# Patient Record
Sex: MALE | Race: WHITE | NOT HISPANIC OR LATINO | Employment: OTHER | URBAN - METROPOLITAN AREA
[De-identification: names, ages, dates, MRNs, and addresses within clinical notes are randomized per-mention and may not be internally consistent; named-entity substitution may affect disease eponyms.]

---

## 2017-06-28 ENCOUNTER — OFFICE VISIT (OUTPATIENT)
Dept: NEUROLOGY | Facility: CLINIC | Age: 67
End: 2017-06-28

## 2017-06-28 ENCOUNTER — LAB (OUTPATIENT)
Dept: LAB | Facility: HOSPITAL | Age: 67
End: 2017-06-28

## 2017-06-28 VITALS
WEIGHT: 203 LBS | HEIGHT: 71 IN | BODY MASS INDEX: 28.42 KG/M2 | DIASTOLIC BLOOD PRESSURE: 79 MMHG | SYSTOLIC BLOOD PRESSURE: 122 MMHG

## 2017-06-28 DIAGNOSIS — G31.84 MILD COGNITIVE IMPAIRMENT: ICD-10-CM

## 2017-06-28 DIAGNOSIS — G31.84 MILD COGNITIVE IMPAIRMENT: Primary | ICD-10-CM

## 2017-06-28 LAB
ALBUMIN SERPL-MCNC: 4.3 G/DL (ref 3.2–4.8)
ALBUMIN/GLOB SERPL: 1.6 G/DL (ref 1.5–2.5)
ALP SERPL-CCNC: 99 U/L (ref 25–100)
ALT SERPL W P-5'-P-CCNC: 46 U/L (ref 7–40)
ANION GAP SERPL CALCULATED.3IONS-SCNC: 5 MMOL/L (ref 3–11)
AST SERPL-CCNC: 46 U/L (ref 0–33)
BASOPHILS # BLD AUTO: 0.02 10*3/MM3 (ref 0–0.2)
BASOPHILS NFR BLD AUTO: 0.3 % (ref 0–1)
BILIRUB SERPL-MCNC: 0.7 MG/DL (ref 0.3–1.2)
BUN BLD-MCNC: 17 MG/DL (ref 9–23)
BUN/CREAT SERPL: 18.9 (ref 7–25)
CALCIUM SPEC-SCNC: 9.4 MG/DL (ref 8.7–10.4)
CHLORIDE SERPL-SCNC: 106 MMOL/L (ref 99–109)
CO2 SERPL-SCNC: 30 MMOL/L (ref 20–31)
CREAT BLD-MCNC: 0.9 MG/DL (ref 0.6–1.3)
DEPRECATED RDW RBC AUTO: 45.9 FL (ref 37–54)
EOSINOPHIL # BLD AUTO: 0.24 10*3/MM3 (ref 0–0.3)
EOSINOPHIL NFR BLD AUTO: 3.4 % (ref 0–3)
ERYTHROCYTE [DISTWIDTH] IN BLOOD BY AUTOMATED COUNT: 14.2 % (ref 11.3–14.5)
FOLATE SERPL-MCNC: 23.97 NG/ML (ref 3.2–20)
GFR SERPL CREATININE-BSD FRML MDRD: 84 ML/MIN/1.73
GLOBULIN UR ELPH-MCNC: 2.7 GM/DL
GLUCOSE BLD-MCNC: 101 MG/DL (ref 70–100)
HCT VFR BLD AUTO: 45.5 % (ref 38.9–50.9)
HGB BLD-MCNC: 15.3 G/DL (ref 13.1–17.5)
IMM GRANULOCYTES # BLD: 0.01 10*3/MM3 (ref 0–0.03)
IMM GRANULOCYTES NFR BLD: 0.1 % (ref 0–0.6)
LYMPHOCYTES # BLD AUTO: 1.89 10*3/MM3 (ref 0.6–4.8)
LYMPHOCYTES NFR BLD AUTO: 26.9 % (ref 24–44)
MCH RBC QN AUTO: 29.5 PG (ref 27–31)
MCHC RBC AUTO-ENTMCNC: 33.6 G/DL (ref 32–36)
MCV RBC AUTO: 87.8 FL (ref 80–99)
MONOCYTES # BLD AUTO: 0.54 10*3/MM3 (ref 0–1)
MONOCYTES NFR BLD AUTO: 7.7 % (ref 0–12)
NEUTROPHILS # BLD AUTO: 4.33 10*3/MM3 (ref 1.5–8.3)
NEUTROPHILS NFR BLD AUTO: 61.6 % (ref 41–71)
PLATELET # BLD AUTO: 181 10*3/MM3 (ref 150–450)
PMV BLD AUTO: 9.8 FL (ref 6–12)
POTASSIUM BLD-SCNC: 4.8 MMOL/L (ref 3.5–5.5)
PROT SERPL-MCNC: 7 G/DL (ref 5.7–8.2)
RBC # BLD AUTO: 5.18 10*6/MM3 (ref 4.2–5.76)
RPR SER QL: NORMAL
SODIUM BLD-SCNC: 141 MMOL/L (ref 132–146)
TSH SERPL DL<=0.05 MIU/L-ACNC: 4.65 MIU/ML (ref 0.35–5.35)
VIT B12 BLD-MCNC: 492 PG/ML (ref 211–911)
WBC NRBC COR # BLD: 7.03 10*3/MM3 (ref 3.5–10.8)

## 2017-06-28 PROCEDURE — 99205 OFFICE O/P NEW HI 60 MIN: CPT | Performed by: PSYCHIATRY & NEUROLOGY

## 2017-06-28 PROCEDURE — 84443 ASSAY THYROID STIM HORMONE: CPT | Performed by: PSYCHIATRY & NEUROLOGY

## 2017-06-28 PROCEDURE — 36415 COLL VENOUS BLD VENIPUNCTURE: CPT | Performed by: PSYCHIATRY & NEUROLOGY

## 2017-06-28 PROCEDURE — 80053 COMPREHEN METABOLIC PANEL: CPT | Performed by: PSYCHIATRY & NEUROLOGY

## 2017-06-28 PROCEDURE — 82607 VITAMIN B-12: CPT | Performed by: PSYCHIATRY & NEUROLOGY

## 2017-06-28 PROCEDURE — 82746 ASSAY OF FOLIC ACID SERUM: CPT | Performed by: PSYCHIATRY & NEUROLOGY

## 2017-06-28 PROCEDURE — 86592 SYPHILIS TEST NON-TREP QUAL: CPT | Performed by: PSYCHIATRY & NEUROLOGY

## 2017-06-28 PROCEDURE — 85025 COMPLETE CBC W/AUTO DIFF WBC: CPT | Performed by: PSYCHIATRY & NEUROLOGY

## 2017-06-28 RX ORDER — DONEPEZIL HYDROCHLORIDE 5 MG/1
5 TABLET, FILM COATED ORAL DAILY
Qty: 30 TABLET | Refills: 11 | Status: SHIPPED | OUTPATIENT
Start: 2017-06-28 | End: 2017-07-31

## 2017-06-28 RX ORDER — CHLORAL HYDRATE 500 MG
CAPSULE ORAL
COMMUNITY
End: 2020-10-14

## 2017-06-28 NOTE — PROGRESS NOTES
"Subjective     CC: cognitive impairment    History of Present Illness   Christo Prince is a 67 y.o. male who comes to clinic today for evaluation of cognitive impairment. He has noted symptoms since July, 2016 after CABG at [facility] marked primarily by word-finding difficulties . His comprehension seems intact, and he is not making paraphasic errors. This has possible worsening over time. Additional associated symptoms have included impairments in executive function. His symptoms are worsened when under stress. He is currently residing at home with his wife in [place].     I reviewed an MRI of the brain from 8/2/17 which shows multiple punctate bilateral infarcts including one left frontally.    The following portions of the patient's history were reviewed and updated as appropriate: allergies, current medications, past family history, past medical history, past social history, past surgical history and problem list.    Review of Systems   Constitutional: Negative.    Respiratory: Negative.    Cardiovascular: Negative.    Gastrointestinal: Negative.    Genitourinary: Negative.    All other systems reviewed and are negative.      Objective   General appearance today is normal.   Peripheral pulses were present and symmetric.   The ophthalmoscopic exam today is unremarkable. The discs and posterior elements are unremarkable.    /79  Ht 71\" (180.3 cm)  Wt 203 lb (92.1 kg)  BMI 28.31 kg/m2    Physical Exam   Constitutional: He is oriented to person, place, and time.   Neurological: He is oriented to person, place, and time. He has normal strength. He has a normal Finger-Nose-Finger Test. Gait normal.   Reflex Scores:       Tricep reflexes are 2+ on the right side and 2+ on the left side.       Bicep reflexes are 2+ on the right side and 2+ on the left side.       Brachioradialis reflexes are 2+ on the right side and 2+ on the left side.       Patellar reflexes are 2+ on the right side and 2+ on the left " side.       Achilles reflexes are 2+ on the right side and 2+ on the left side.  Psychiatric: His speech is normal.        Neurologic Exam     Mental Status   Oriented to person, place, and time.   Registration: recalls 3 of 3 objects. Recall at 5 minutes: recalls 1 of 3 objects. Follows 3 step commands.   Attention: normal. Concentration: normal.   Speech: speech is normal   Level of consciousness: alert  Knowledge: good.   Able to name object. Able to read. Able to repeat. Able to write. Normal comprehension.     Cranial Nerves   Cranial nerves II through XII intact.     Motor Exam   Muscle bulk: normal  Overall muscle tone: normal    Strength   Strength 5/5 throughout.     Sensory Exam   Light touch normal.     Gait, Coordination, and Reflexes     Gait  Gait: normal    Coordination   Finger to nose coordination: normal    Reflexes   Right brachioradialis: 2+  Left brachioradialis: 2+  Right biceps: 2+  Left biceps: 2+  Right triceps: 2+  Left triceps: 2+  Right patellar: 2+  Left patellar: 2+  Right achilles: 2+  Left achilles: 2+      MMSE=28      Assessment/Plan   Christo was seen today for memory loss.    Diagnoses and all orders for this visit:    Mild cognitive impairment  -     CBC & Differential; Future  -     Comprehensive Metabolic Panel  -     Folate  -     Vitamin B12  -     TSH  -     RPR  -     Ambulatory Referral to Speech Therapy    Other orders  -     donepezil (ARICEPT) 5 MG tablet; Take 1 tablet by mouth Daily.          DISCUSSION/SUMMARY    Christo Prince comes to clinic today for evaluation of cognitive impairment. His history and examination, including bedside cognitive testing are most consistent with Mild Cognitive Impairment (MCI) , which was discussed. This may be due to prior stroke, and VCI, though we will need to follow this to make sure this does not progress into another disorder. For now it was elected to obtain screening blood work . After discussing potential treatment options, it was  elected to add  donepezil. He will then follow up in 1 month , or sooner if needed. At that time, we will consider increasing his donepezil and/or the addition of Namenda. I also referred him for SLP.    As part of this visit I reviewed radiology results, reviewed radiology images, obtained additional history from the family which is incorporated in the HPI and reviewed records from prior hospitalizations which is incorporated in the HPI. Please see above for additional details.

## 2017-07-07 ENCOUNTER — HOSPITAL ENCOUNTER (OUTPATIENT)
Dept: SPEECH THERAPY | Facility: HOSPITAL | Age: 67
Setting detail: THERAPIES SERIES
Discharge: HOME | End: 2017-07-07

## 2017-07-07 DIAGNOSIS — G31.84 MILD COGNITIVE IMPAIRMENT: Primary | ICD-10-CM

## 2017-07-07 PROCEDURE — 92523 SPEECH SOUND LANG COMPREHEN: CPT

## 2017-07-07 PROCEDURE — G9168 MEMORY CURRENT STATUS: HCPCS

## 2017-07-07 PROCEDURE — G9169 MEMORY GOAL STATUS: HCPCS

## 2017-07-07 NOTE — THERAPY EVALUATION
"Outpatient Speech Language Pathology   Adult Speech Language Cognitive Initial Evaluation   [place]     Patient Name: Christo Prince  : 1950  MRN: [id number]  Today's Date: 2017        Visit Date: 2017   Patient Active Problem List   Diagnosis   • Benign essential hypertension   • Gastroesophageal reflux disease   • CAD in native artery   • Fahr's disease   • S/P CABG x 5   • Mild cognitive impairment        Past Medical History:   Diagnosis Date   • Coronary artery disease    • Migraines         Past Surgical History:   Procedure Laterality Date   • CORONARY ARTERY BYPASS GRAFT N/A 2016    Procedure: INTRAOPERATIVE FRANCES, MIDLINE STERNOTOMY WITH CORONARY ARTERY BYPASS GRAPHS X 5 UTILIZING BILTERAL ANGELA'S AND LEFT ENDOSCOPIC HARVESTED SAPHENOUS VEIN, AND RIGHT OPEN HARVESTED SAPHENOUS VEIN;  Surgeon: De Montgomery MD;  Location: [facility];  Service:    • KNEE SURGERY Right          Visit Dx:    ICD-10-CM ICD-9-CM   1. Mild cognitive impairment G31.84 331.83                 Adult Speech Language - 17 0800     Background and History    Reason for Referral Pt is a 67 year old male with a hx of CVA and in the past year has noticed word finding difficulties. Pt's wife states that he has Alzheimer's but pt does not think he does.   -HG    Stated Goals \"To join the conversation again.\"  -    Description of Complaint Pt states that he is slow to respond and there are  certain things he can't recall but during assessment, he couldn't find the words to explain them.  -HG    Previous Functional Status Functional in all spheres  -HG    Current Baseline Abilities Pt independent with all tasks including bathing, dressing, med management, finances, etc.  -HG    Primary Language in the Home English  -HG    Primary Caregiver Spouse  -HG    Informant for the Evaluation Self  -HG    Comprehension    Recognition WFL: Within Functional Limits  -HG    Answer Questions WFL: Within Functional Limits  " -HG    Commands WFL: Within Functional Limits  -HG    Conversation WFL: Within Functional Limits  -HG    Reading Status WFL: Within Functional Limits  -HG    Expression    Primary Mode of Expression verbal  -HG    Dominant Hand Right  -HG    Expressive Language WFL  -HG    Receptive Language WFL  -HG    RBANS- Repeatable Battery for the Assessment of Neuropsychological Status    Immediate Memory Index Score 73  -HG    Immediate Memory Percentile 4 %  -HG    Immediate Memory Qualitative Description borderline  -HG    Visuospatial Index Score 72  -HG    Visuospatial Percentile 3 %  -HG    Visuospatial Qualitative Description borderline  -HG    Language Index Score 74  -HG    Language Percentile 4 %  -HG    Language Qualitative Description borderline  -HG    Attention Index Score 68  -HG    Attention Percentile 2 %  -HG    Attention Qualitative Description extremely low  -HG    Delayed Memory Index Score 60  -HG    Delayed Memory Percentile 0 %  -HG    Delayed Memory Qualitative Description extremely low  -HG    Total Index Score 347  -HG    Total Percentile 1 %  -HG    Total Qualitative Description extremely low  -HG      User Key  (r) = Recorded By, (t) = Taken By, (c) = Cosigned By    Initials Name Provider Type     Jennifer Arreola MS [facility]-SLP Speech and Language Pathologist                               OP SLP Education       07/07/17 0800    Education    Barriers to Learning No barriers identified  -    Education Provided Described results of evaluation;Patient expressed understanding of evaluation;Family/caregivers expressed understanding of evaluation;Patient requires further education on strategies, risks;Family/caregivers require further education on strategies, risks  -    Assessed Learning needs;Learning motivation;Learning preferences;Learning readiness  -    Learning Motivation Strong  -    Learning Method Explanation;Demonstration;Teach back;Written materials  -    Teaching Response  "Verbalized understanding;Demonstrated understanding;Reinforcement needed  -HG    Education Comments Pt provided with copy of internal and external memory strategies. Pt also given homework for word finding.   -HG      User Key  (r) = Recorded By, (t) = Taken By, (c) = Cosigned By    Initials Name Effective Dates    HG Jennifer Arreola, MS [facility]-SLP 06/22/15 -                 SLP OP Goals       07/07/17 1000       Goal Type Needed    Goal Type Needed Memory;Verbal Expression;Other Adult Goals  -HG     Subjective Comments    Subjective Comments Pt alert, cooperative, quiet in nature and stated, \"my wife doesn't let me out of her site\" Wife joined us at the end of the session for education regarding compensatory memory strategies.   -HG     Verbal Expression Goals    Verbal Expression LTG's Patient will be able to use verbal expressive language skills to communicate effectively in all situations with unfamiliar listener  -HG     Patient will be able to use verbal expressive language skills to communicate effectively in all situations with unfamiliar listener 90%:;with intermittent cues  -HG     Status: Patient will be able to use verbal expressive language skills to communicate effectively in all situations with unfamiliar listener New  -HG     Patient will improve verbal expressive language skills by describing single/multiple similarities and differences between two target items 80%:;with cues  -HG     Status: Patient will improve verbal expressive language skills by describing single/multiple similarities and differences between two target items New  -HG     Patient will improve verbal expressive language skills by completing divergent naming tasks 90%:;with cues  -HG     Status: Patient will improve verbal expressive language skills by completing divergent naming tasks New  -HG     Patient will improve verbal expressive language skills by listing words associated to target word provided 80%  -HG     Status: Patient " will improve verbal expressive language skills by listing words associated to target word provided New  -HG     Patient will improve verbal expressive language skills by describing attributes, function, action and/or uses of an object/item 80%:;with cues  -HG     Status: Patient will improve verbal expressive language skills by describing attributes, function, action and/or uses of an object/item New  -HG     Patient will improve verbal expressive language skills by completing convergent naming tasks 80%:;with cues  -HG     Status: Patient will improve verbal expressive language skills by completing convergent naming tasks New  -HG     Memory Goals    Patient and family will implement compensatory strategies to maximize patient’s Memory function so patient can continue to participate in daily activities 90%:;with intermittent cues  -HG     Status: Patient and family will implement compensatory strategies to maximize patient’s Memory function so patient can continue to participate in daily activities New  -HG     Patient will demonstrate improved ability to recall information by immediately recalling a series of words 80%:;related;with intermittent cues  -HG     Status: Patient will demonstrate improved ability to recall information by immediately recalling a series of words New  -HG     Patient’s memory skills will be enhanced as reported by patient by utilizing internal memory strategies to recall up to 3 pieces of information after a 5- minute delay 80%:;with intermittent cues  -HG     Status: Patient’s memory skills will be enhanced as reported by patient by utilizing internal memory strategies to recall up to 3 pieces of information after a 5- minute delay New  -HG     Status: Patient’s memory skills will be enhanced as reported by patient by using external memory aides New  -HG     Other Goals    Other Adult Goal- 1 Pt will participate in one social activity a week in order to improve his functional language at  conversation level as reported by the wife.  -HG     Status: Other Adult Goal- 1 New  -HG     Other Adult Goal- 2 Pt will improve RBANS score to at least the low average range across all assessments.   -HG     Status: Other Adult Goal- 2 New  -HG     SLP Time Calculation    SLP Goal Re-Cert Due Date 08/06/17  -HG       User Key  (r) = Recorded By, (t) = Taken By, (c) = Cosigned By    Initials Name Provider Type     Jennifer Arreola MS [facility]-SLP Speech and Language Pathologist                OP SLP Assessment/Plan - 07/07/17 0800     SLP Assessment    Functional Problems Speech Language- Adult/Cognition  -HG    Impact on Function: Adult Speech Language/Cognition Difficulty communicating wants, needs, and ideas;Restrictions in personal and social life;Difficulty sequencing thoughts to express complex messages;Difficulty participating in avocational activities;Trouble learning or remembering new information;Poor attention to task  -HG    Clinical Impression: Speech Language-Adult/Congnition Moderate-Severe:;Cognitive Communication Impairment  -HG    Functional Problems Comment Pt is not socializing and has restricted his socialization due to his word finding difficutlies.  -HG    Clinical Impression Comments RBANS given and Total score fell into the Extremely Low Range.  -HG    Please refer to paper survey for additional self-reported information Yes  -HG    Please refer to items scanned into chart for additional diagnostic informaiton and handouts as provided by clinician Yes  -HG    SLP Diagnosis Moderate to Severe Cognitive Linguistic deficits  -HG    Prognosis Good (comment)  -HG    Patient/caregiver participated in establishment of treatment plan and goals Yes  -HG    Patient would benefit from skilled therapy intervention Yes  -HG    SLP Plan    Frequency 2x/week  -HG    Duration 8 weeks  -HG    Planned CPT's? SLP SPEECH & LANGUAGE EVAL: 43445;SLP INDIVIDUAL SPEECH THERAPY: 10666  -HG    Expected Duration  Therapy Session (min) 45-60 minutes  -HG    Plan Comments Initiate Cognitve tx.   -HG      User Key  (r) = Recorded By, (t) = Taken By, (c) = Cosigned By    Initials Name Provider Type    [place]ON Arreola MS CCC-SLP Speech and Language Pathologist                 Time Calculation:   SLP Start Time: 0800    Therapy Charges for Today     Code Description Service Date Service Provider Modifiers Qty    69048464372 HC ST MEMORY CURRENT 7/7/2017 Jennifer Arreola MS CCC-SLP GN, CL 1    97856667473 HC ST MEMORY PROJECTED 7/7/2017 Jennifer Arreola MS CCC-SLP GN, CJ 1    [id number] HC ST EVAL SPEECH AND PROD W LANG  5 7/7/2017 Jennifer Arreola MS CCC-SLP GN 1          SLP G-Codes  Functional Limitations: Memory  Memory Current Status (): At least 60 percent but less than 80 percent impaired, limited or restricted  Memory Goal Status (): At least 20 percent but less than 40 percent impaired, limited or restricted        Jennifer Arreola MS CCC-SLP  7/7/2017

## 2017-07-10 ENCOUNTER — HOSPITAL ENCOUNTER (OUTPATIENT)
Dept: SPEECH THERAPY | Facility: HOSPITAL | Age: 67
Setting detail: THERAPIES SERIES
Discharge: HOME | End: 2017-07-10

## 2017-07-10 DIAGNOSIS — G31.84 MILD COGNITIVE IMPAIRMENT: Primary | ICD-10-CM

## 2017-07-10 PROCEDURE — 92507 TX SP LANG VOICE COMM INDIV: CPT

## 2017-07-10 NOTE — THERAPY TREATMENT NOTE
"Outpatient Speech Language Pathology   Adult Speech Language Cognitive Treatment Note   [place]     Patient Name: Christo Prince  : 1950  MRN: [id number]  Today's Date: 7/10/2017         Visit Date: 07/10/2017   Patient Active Problem List   Diagnosis   • Benign essential hypertension   • Gastroesophageal reflux disease   • CAD in native artery   • Fahr's disease   • S/P CABG x 5   • Mild cognitive impairment          Visit Dx:    ICD-10-CM ICD-9-CM   1. Mild cognitive impairment G31.84 331.83             Adult Speech Language - 17 0800     Background and History    Reason for Referral Pt is a 67 year old male with a hx of CVA and in the past year has noticed word finding difficulties. Pt's wife states that he has Alzheimer's but pt does not think he does.   -HG    Stated Goals \"To join the conversation again.\"  -HG    Description of Complaint Pt states that he is slow to respond and there are  certain things he can't recall but during assessment, he couldn't find the words to explain them.  -HG    Previous Functional Status Functional in all spheres  -HG    Current Baseline Abilities Pt independent with all tasks including bathing, dressing, med management, finances, etc.  -HG    Pertinent Medications donepezil  -HG    Primary Language in the Home English  -HG    Primary Caregiver Spouse  -HG    Informant for the Evaluation Self  -HG    Comprehension    Recognition WFL: Within Functional Limits  -HG    Answer Questions WFL: Within Functional Limits  -HG    Commands WFL: Within Functional Limits  -HG    Conversation WFL: Within Functional Limits  -HG    Reading Status WFL: Within Functional Limits  -HG    Expression    Primary Mode of Expression verbal  -HG    Dominant Hand Right  -HG    Expressive Language WFL  -HG    Receptive Language WFL  -HG    Cognitive Communication and Memory    Attention WFL except;sustained attention;selective attention;alternating attention;divided attention  -HG    " Sustained Attention Moderate severe  -HG    Selective Attention Moderate severe  -HG    Alternating Attention To be assessed in therapy  -HG    Divided Attention To be assessed in therapy  -HG    Orientation WFL: Within Functional Limits  -HG    Memory WFL except;working memory;procedural  -HG    Working Memory Moderate severe  -HG    Procedural Memory Moderate severe  -HG    Executive Function to be assessed in therapy  -HG    Calculations to be assessed in therapy  -HG    RBANS- Repeatable Battery for the Assessment of Neuropsychological Status    Immediate Memory Index Score 73  -HG    Immediate Memory Percentile 4 %  -HG    Immediate Memory Qualitative Description borderline  -HG    Visuospatial Index Score 72  -HG    Visuospatial Percentile 3 %  -HG    Visuospatial Qualitative Description borderline  -HG    Language Index Score 74  -HG    Language Percentile 4 %  -HG    Language Qualitative Description borderline  -HG    Attention Index Score 68  -HG    Attention Percentile 2 %  -HG    Attention Qualitative Description extremely low  -HG    Delayed Memory Index Score 60  -HG    Delayed Memory Percentile 0 %  -HG    Delayed Memory Qualitative Description extremely low  -HG    Total Index Score 347  -HG    Total Percentile 1 %  -HG    Total Qualitative Description extremely low  -HG      User Key  (r) = Recorded By, (t) = Taken By, (c) = Cosigned By    Initials Name Provider Type     Jennifer Arreola MS CCC-SLP Speech and Language Pathologist                              SLP OP Goals       07/10/17 0800       Goal Type Needed    Goal Type Needed Verbal Expression;Memory;Other Adult Goals  -HG     Subjective Comments    Subjective Comments Pt alert, cooperative and stated that he felt about 'half'  -HG     Verbal Expression Goals    Verbal Expression LTG's Patient will be able to use verbal expressive language skills to communicate effectively in all situations with unfamiliar listener  -HG     Patient will be  able to use verbal expressive language skills to communicate effectively in all situations with unfamiliar listener 90%:;with intermittent cues  -HG     Status: Patient will be able to use verbal expressive language skills to communicate effectively in all situations with unfamiliar listener Progressing as expected  -HG     Comments: Patient will be able to use verbal expressive language skills to communicate effectively in all situations with unfamiliar listener 7/10/17: Pt completed a divergent naming task for homework with 80% accuracy.   -HG     Patient will improve verbal expressive language skills by describing single/multiple similarities and differences between two target items 80%:;with cues  -HG     Status: Patient will improve verbal expressive language skills by describing single/multiple similarities and differences between two target items Progressing as expected  -HG     Comments: Patient will improve verbal expressive language skills by describing single/multiple similarities and differences between two target items 7/10/17: Similarities and Differences:   -HG     Patient will improve verbal expressive language skills by completing divergent naming tasks 90%:;with cues  -HG     Status: Patient will improve verbal expressive language skills by completing divergent naming tasks Progressing as expected  -HG     Comments: Patient will improve verbal expressive language skills by completing divergent naming tasks 7/10/17: Homework for divergent concrete category naming and pt was 80% accurate.   -HG     Patient will improve verbal expressive language skills by listing words associated to target word provided 80%  -HG     Status: Patient will improve verbal expressive language skills by listing words associated to target word provided New  -HG     Patient will improve verbal expressive language skills by describing attributes, function, action and/or uses of an object/item 80%:;with cues  -HG     Status:  Patient will improve verbal expressive language skills by describing attributes, function, action and/or uses of an object/item New  -HG     Patient will improve verbal expressive language skills by completing convergent naming tasks 80%:;with cues  -HG     Status: Patient will improve verbal expressive language skills by completing convergent naming tasks Progressing as expected  -HG     Comments: Patient will improve verbal expressive language skills by completing convergent naming tasks 7/10/17: Convergent naming: pt was 50% accurate.   -HG     Memory Goals    Patient and family will implement compensatory strategies to maximize patient’s Memory function so patient can continue to participate in daily activities 90%:;with intermittent cues  -HG     Status: Patient and family will implement compensatory strategies to maximize patient’s Memory function so patient can continue to participate in daily activities Progressing as expected  -HG     Comments: Patient and family will implement compensatory strategies to maximize patient’s Memory function so patient can continue to participate in daily activities 7/10/17: Pt stated he used visualization at home when working on divergent naming task.   -HG     Patient will demonstrate improved ability to recall information by immediately recalling a series of words 80%:;related;with intermittent cues  -HG     Status: Patient will demonstrate improved ability to recall information by immediately recalling a series of words Progressing as expected  -HG     Comments: Patient will demonstrate improved ability to recall information by immediately recalling a series of words 7/10/17: 3 word: 3/3x 6, 4 word: 3/3 x 6. 3 word reverse order: pt was 90% accurate, 4 word reversal: 70% accuracy.   -HG     Patient’s memory skills will be enhanced as reported by patient by utilizing internal memory strategies to recall up to 3 pieces of information after a 5- minute delay 80%:;with  intermittent cues  -HG     Status: Patient’s memory skills will be enhanced as reported by patient by utilizing internal memory strategies to recall up to 3 pieces of information after a 5- minute delay Progressing as expected  -HG     Comments: Patient’s memory skills will be enhanced as reported by patient by utilizing internal memory strategies to recall up to 3 pieces of information after a 5- minute delay 7/10/17: Three related words: pt was 3/3 and used association for recall purposes  -HG     Status: Patient’s memory skills will be enhanced as reported by patient by using external memory aides Progressing as expected  -HG     Comments: Patient’s memory skills will be enhanced as reported by patient by using external memory aides 7/10/17: For 3 and 4 word mental manipulation, grouping of items was used and pt's accuracy improved by 10-15%.   -HG     Other Goals    Other Adult Goal- 1 Pt will participate in one social activity a week in order to improve his functional language at conversation level as reported by the wife.  -HG     Status: Other Adult Goal- 1 New  -HG     Other Adult Goal- 2 Pt will improve RBANS score to at least the low average range across all assessments.   -HG     Status: Other Adult Goal- 2 New  -HG     SLP Time Calculation    SLP Goal Re-Cert Due Date 08/06/17  -HG       User Key  (r) = Recorded By, (t) = Taken By, (c) = Cosigned By    Initials Name Provider Type    LONDON Arreola MS [facility]-SLP Speech and Language Pathologist                OP SLP Education       07/10/17 0800    Education    Education Comments Pt given homework for thought organization and attention.   -HG      User Key  (r) = Recorded By, (t) = Taken By, (c) = Cosigned By    Initials Name Effective Dates    LONDON Arreola MS [facility]-SLP 06/22/15 -                 OP SLP Assessment/Plan - 07/10/17 0800     SLP Plan    Plan Comments Cont with Cog tx.   -HG      User Key  (r) = Recorded By, (t) = Taken By, (c) =  Cosigned By    Initials Name Provider Type     Jennifer Arreola, MS CCC-SLP Speech and Language Pathologist                 Time Calculation:   SLP Start Time: 0800    Therapy Charges for Today     Code Description Service Date Service Provider Modifiers Qty    [id number]  ST TREATMENT SPEECH 4 7/10/2017 Jennifer Arreola MS CCC-SLP GN 1                   Jennifer Arreola MS CCC-SLP  7/10/2017

## 2017-07-14 ENCOUNTER — HOSPITAL ENCOUNTER (OUTPATIENT)
Dept: SPEECH THERAPY | Facility: HOSPITAL | Age: 67
Setting detail: THERAPIES SERIES
Discharge: HOME | End: 2017-07-14

## 2017-07-14 DIAGNOSIS — G31.84 MILD COGNITIVE IMPAIRMENT: Primary | ICD-10-CM

## 2017-07-14 PROCEDURE — 92507 TX SP LANG VOICE COMM INDIV: CPT

## 2017-07-14 NOTE — THERAPY TREATMENT NOTE
Outpatient Speech Language Pathology   Adult Speech Language Cognitive Treatment Note  [facility]     Patient Name: Christo Prince  : 1950  MRN: [id number]  Today's Date: 2017         Visit Date: 2017   Patient Active Problem List   Diagnosis   • Benign essential hypertension   • Gastroesophageal reflux disease   • CAD in native artery   • Fahr's disease   • S/P CABG x 5   • Mild cognitive impairment          Visit Dx:    ICD-10-CM ICD-9-CM   1. Mild cognitive impairment G31.84 331.83                               SLP OP Goals       17 0845       Goal Type Needed    Goal Type Needed Verbal Expression;Memory;Other Adult Goals  -HG     Subjective Comments    Subjective Comments Pt alert, cooperative and commented that wife and daughter both think he is doing better.   -HG     Verbal Expression Goals    Verbal Expression LTG's Patient will be able to use verbal expressive language skills to communicate effectively in all situations with unfamiliar listener  -HG     Patient will be able to use verbal expressive language skills to communicate effectively in all situations with unfamiliar listener 90%:;with intermittent cues  -HG     Status: Patient will be able to use verbal expressive language skills to communicate effectively in all situations with unfamiliar listener Progressing as expected  -HG     Comments: Patient will be able to use verbal expressive language skills to communicate effectively in all situations with unfamiliar listener 7/10/17: Pt completed a divergent naming task for homework with 80% accuracy.   -HG     Patient will improve verbal expressive language skills by describing single/multiple similarities and differences between two target items 80%:;with cues  -HG     Status: Patient will improve verbal expressive language skills by describing single/multiple similarities and differences between two target items Progressing as expected  -HG     Comments: Patient will improve  verbal expressive language skills by describing single/multiple similarities and differences between two target items 7/10/17: Similarities and Differences:   -HG     Patient will improve verbal expressive language skills by completing divergent naming tasks 90%:;with cues  -HG     Status: Patient will improve verbal expressive language skills by completing divergent naming tasks Progressing as expected  -HG     Comments: Patient will improve verbal expressive language skills by completing divergent naming tasks  7/10/17: Homework for divergent concrete category naming and pt was 80% accurate.   -HG     Patient will improve verbal expressive language skills by listing words associated to target word provided 80%  -HG     Status: Patient will improve verbal expressive language skills by listing words associated to target word provided Progressing as expected  -HG     Comments: Patient will improve verbal expressive language skills by listing words associated to target word provided 7/14/17: Pt was 50% accurate Independently.   -HG     Patient will improve verbal expressive language skills by describing attributes, function, action and/or uses of an object/item 80%:;with cues  -HG     Status: Patient will improve verbal expressive language skills by describing attributes, function, action and/or uses of an object/item New  -HG     Patient will improve verbal expressive language skills by completing convergent naming tasks 80%:;with cues  -HG     Status: Patient will improve verbal expressive language skills by completing convergent naming tasks Progressing as expected  -HG     Comments: Patient will improve verbal expressive language skills by completing convergent naming tasks 7/14/17: Convergent naming for concrete: pt was 80% accurate. 7/10/17: Convergent naming: pt was 50% accurate.   -HG     Memory Goals    Patient and family will implement compensatory strategies to maximize patient’s Memory function so patient  can continue to participate in daily activities 90%:;with intermittent cues  -HG     Status: Patient and family will implement compensatory strategies to maximize patient’s Memory function so patient can continue to participate in daily activities Progressing as expected  -HG     Comments: Patient and family will implement compensatory strategies to maximize patient’s Memory function so patient can continue to participate in daily activities 7/14/17: Pt stated that he doesn't want to use a notepad or notebook to write happenings down at this time. 7/10/17: Pt stated he used visualization at home when working on divergent naming task.   -HG     Patient will demonstrate improved ability to recall information by immediately recalling a series of words 80%:;related;with intermittent cues  -HG     Status: Patient will demonstrate improved ability to recall information by immediately recalling a series of words Progressing as expected  -HG     Comments: Patient will demonstrate improved ability to recall information by immediately recalling a series of words 7/14/17: 3 related word recall and pt was 3/3 x 2; 4 related words:  7/10/17: 3 word: 3/3x 6, 4 word: 3/3 x 6. 3 word reverse order: pt was 90% accurate, 4 word reversal: 70% accuracy.   -HG     Patient’s memory skills will be enhanced as reported by patient by utilizing internal memory strategies to recall up to 3 pieces of information after a 5- minute delay 80%:;with intermittent cues  -HG     Status: Patient’s memory skills will be enhanced as reported by patient by utilizing internal memory strategies to recall up to 3 pieces of information after a 5- minute delay Progressing as expected  -HG     Comments: Patient’s memory skills will be enhanced as reported by patient by utilizing internal memory strategies to recall up to 3 pieces of information after a 5- minute delay 7/14/17: Progressed to 4 related word recall and pt was 3/4 x 2 with cues required. 7/10/17:  Three related words: pt was 3/3 and used association for recall purposes  -HG     Status: Patient’s memory skills will be enhanced as reported by patient by using external memory aides Progressing as expected  -HG     Comments: Patient’s memory skills will be enhanced as reported by patient by using external memory aides 7/14/17: 4 word mental manipulation, reverse order and pt was 60% accurate. 7/10/17: For 3 and 4 word mental manipulation, grouping of items was used and pt's accuracy improved by 10-15%.   -HG     Other Goals    Other Adult Goal- 1 Pt will participate in one social activity a week in order to improve his functional language at conversation level as reported by the wife.  -HG     Status: Other Adult Goal- 1 Progressing as expected  -HG     Comments: Other Adult Goal- 1 7/14/17: Pt stated he had attended [employer] this past Sunday and that he and wife are still walking. Challenged to join the men's group at [employer] like he used to do.   -HG     Other Adult Goal- 2 Pt will improve RBANS score to at least the low average range across all assessments.   -HG     Status: Other Adult Goal- 2 New  -HG     SLP Time Calculation    SLP Goal Re-Cert Due Date 08/06/17  -HG       User Key  (r) = Recorded By, (t) = Taken By, (c) = Cosigned By    Initials Name Provider Type    LONDON Arreola MS CCC-SLP Speech and Language Pathologist                OP SLP Education       07/14/17 0845    Education    Education Comments Pt given homework for word finding, recall and and thought organization.   -HG      User Key  (r) = Recorded By, (t) = Taken By, (c) = Cosigned By    Initials Name Effective Dates    LONDON Arreola MS CCC-SLP 06/22/15 -                 OP SLP Assessment/Plan - 07/14/17 0845     SLP Plan    Plan Comments Cont with cog tx with focus on attention and word finding, recall.   -HG      User Key  (r) = Recorded By, (t) = Taken By, (c) = Cosigned By    Initials Name Provider Type    LONDON VILLANUEVA  MS Maxwell CCC-SLP Speech and Language Pathologist                 Time Calculation:   SLP Start Time: 0845    Therapy Charges for Today     Code Description Service Date Service Provider Modifiers Qty    [id number]  ST TREATMENT SPEECH 4 7/14/2017 Jennifer Arreola MS CCC-SLP GN 1                   Jennifer Arreola MS CCC-SLP  7/14/2017

## 2017-07-17 ENCOUNTER — HOSPITAL ENCOUNTER (OUTPATIENT)
Dept: SPEECH THERAPY | Facility: HOSPITAL | Age: 67
Setting detail: THERAPIES SERIES
Discharge: HOME | End: 2017-07-17

## 2017-07-17 DIAGNOSIS — G31.84 MILD COGNITIVE IMPAIRMENT: Primary | ICD-10-CM

## 2017-07-17 PROCEDURE — 92507 TX SP LANG VOICE COMM INDIV: CPT

## 2017-07-17 NOTE — THERAPY TREATMENT NOTE
"Outpatient Speech Language Pathology   Adult Speech Language Cognitive Treatment Note  [facility]     Patient Name: Christo Prince  : 1950  MRN: [id number]  Today's Date: 2017         Visit Date: 2017   Patient Active Problem List   Diagnosis   • Benign essential hypertension   • Gastroesophageal reflux disease   • CAD in native artery   • Fahr's disease   • S/P CABG x 5   • Mild cognitive impairment          Visit Dx:    ICD-10-CM ICD-9-CM   1. Mild cognitive impairment G31.84 331.83                               SLP OP Goals       17 0800       Goal Type Needed    Goal Type Needed Verbal Expression;Memory;Other Adult Goals  -HG     Subjective Comments    Subjective Comments Pt alert, cooperative and feels \"ok\"    -HG     Verbal Expression Goals    Verbal Expression LTG's Patient will be able to use verbal expressive language skills to communicate effectively in all situations with unfamiliar listener  -HG     Patient will be able to use verbal expressive language skills to communicate effectively in all situations with unfamiliar listener 90%:;with intermittent cues  -HG     Status: Patient will be able to use verbal expressive language skills to communicate effectively in all situations with unfamiliar listener Progressing as expected  -HG     Comments: Patient will be able to use verbal expressive language skills to communicate effectively in all situations with unfamiliar listener 7/10/17: Pt completed a divergent naming task for homework with 80% accuracy.   -HG     Patient will improve verbal expressive language skills by describing single/multiple similarities and differences between two target items 80%:;with cues  -HG     Status: Patient will improve verbal expressive language skills by describing single/multiple similarities and differences between two target items Progressing as expected  -HG     Comments: Patient will improve verbal expressive language skills by describing " single/multiple similarities and differences between two target items 7/17/17: For mental manipulation, opposites with a delay and pt was 80% accurate. 7/10/17: Similarities and Differences: 70% accurate.   -HG     Patient will improve verbal expressive language skills by completing divergent naming tasks 90%:;with cues  -HG     Status: Patient will improve verbal expressive language skills by completing divergent naming tasks Progressing as expected  -HG     Comments: Patient will improve verbal expressive language skills by completing divergent naming tasks 7/17/17: For 10 item category naming, pt was 10/10 x 2 7/10/17: Homework for divergent concrete category naming and pt was 80% accurate.   -HG     Patient will improve verbal expressive language skills by listing words associated to target word provided 80%  -HG     Status: Patient will improve verbal expressive language skills by listing words associated to target word provided Progressing as expected  -HG     Comments: Patient will improve verbal expressive language skills by listing words associated to target word provided 7/14/17: Pt was 50% accurate Independently.   -HG     Patient will improve verbal expressive language skills by describing attributes, function, action and/or uses of an object/item 80%:;with cues  -HG     Status: Patient will improve verbal expressive language skills by describing attributes, function, action and/or uses of an object/item New  -HG     Patient will improve verbal expressive language skills by completing convergent naming tasks 80%:;with cues  -HG     Status: Patient will improve verbal expressive language skills by completing convergent naming tasks Progressing as expected  -HG     Comments: Patient will improve verbal expressive language skills by completing convergent naming tasks 7/17/17: For convergent category homework, pt was 70% accurate. 7/14/17: Convergent naming for concrete: pt was 80% accurate. 7/10/17:  Convergent naming: pt was 50% accurate.   -HG     Memory Goals    Patient and family will implement compensatory strategies to maximize patient’s Memory function so patient can continue to participate in daily activities 90%:;with intermittent cues  -HG     Status: Patient and family will implement compensatory strategies to maximize patient’s Memory function so patient can continue to participate in daily activities Progressing as expected  -HG     Comments: Patient and family will implement compensatory strategies to maximize patient’s Memory function so patient can continue to participate in daily activities 7/17/17: Pt this date agreed that he needs to use a notepad and would use one- SLP to provide. 7/14/17: Pt stated that he doesn't want to use a notepad or notebook to write happenings down at this time. 7/10/17: Pt stated he used visualization at home when working on divergent naming task.   -HG     Patient will demonstrate improved ability to recall information by immediately recalling a series of words 80%:;related;with intermittent cues  -HG     Status: Patient will demonstrate improved ability to recall information by immediately recalling a series of words Progressing as expected  -HG     Comments: Patient will demonstrate improved ability to recall information by immediately recalling a series of words 7/17/17: For 4 word mental manipulation reverse order, pt was 75% accurate.  7/14/17: 3 related word recall and pt was 3/3 x 2; 4 related words:  7/10/17: 3 word: 3/3x 6, 4 word: 3/3 x 6. 3 word reverse order: pt was 90% accurate, 4 word reversal: 70% accuracy.   -HG     Patient’s memory skills will be enhanced as reported by patient by utilizing internal memory strategies to recall up to 3 pieces of information after a 5- minute delay 80%:;with intermittent cues  -HG     Status: Patient’s memory skills will be enhanced as reported by patient by utilizing internal memory strategies to recall up to 3  pieces of information after a 5- minute delay Progressing as expected  -HG     Comments: Patient’s memory skills will be enhanced as reported by patient by utilizing internal memory strategies to recall up to 3 pieces of information after a 5- minute delay 7/17/17: For 4 word recall, pt was 4/4 with no cues using linking and rehearsal. For 5 word recall, pt was 4/5 with written and linking strategy. 7/14/17: Progressed to 4 related word recall and pt was 3/4 x 2 with cues required. 7/10/17: Three related words: pt was 3/3 and used association for recall purposes  -HG     Status: Patient’s memory skills will be enhanced as reported by patient by using external memory aides Progressing as expected  -HG     Comments: Patient’s memory skills will be enhanced as reported by patient by using external memory aides 7/14/17: 4 word mental manipulation, reverse order and pt was 60% accurate. 7/10/17: For 3 and 4 word mental manipulation, grouping of items was used and pt's accuracy improved by 10-15%.   -HG     Other Goals    Other Adult Goal- 1 Pt will participate in one social activity a week in order to improve his functional language at conversation level as reported by the wife.  -HG     Status: Other Adult Goal- 1 Progressing as expected  -HG     Comments: Other Adult Goal- 1 7/17/17: Pt stated he went to [employer] and none of the [employer] group was there. 7/14/17: Pt stated he had attended [employer] this past Sunday and that he and wife are still walking. Challenged to join the men's group at [employer] like he used to do.   -HG     Other Adult Goal- 2 Pt will improve RBANS score to at least the low average range across all assessments.   -HG     Status: Other Adult Goal- 2 New  -HG     SLP Time Calculation    SLP Goal Re-Cert Due Date 08/06/17  -HG       User Key  (r) = Recorded By, (t) = Taken By, (c) = Cosigned By    Initials Name Provider Type    LONDON Arreola MS [facility]-SLP Speech and Language Pathologist                 OP SLP Education       07/17/17 0800    Education    Education Comments Pt given thought organization and recall homework.   -HG      User Key  (r) = Recorded By, (t) = Taken By, (c) = Cosigned By    Initials Name Effective Dates    LODNON Arreola MS CCC-SLP 06/22/15 -                 OP SLP Assessment/Plan - 07/17/17 0800     SLP Plan    Plan Comments Cont with cog tx.   -HG      User Key  (r) = Recorded By, (t) = Taken By, (c) = Cosigned By    Initials Name Provider Type    LONDON Arreola MS CCC-SLP Speech and Language Pathologist                 Time Calculation:   SLP Start Time: 0800    Therapy Charges for Today     Code Description Service Date Service Provider Modifiers Qty    [id number] HC ST TREATMENT SPEECH 4 7/17/2017 Jennifer Arreola MS CCC-SLP GN 1                   Jennifer Arreola MS CCC-SLP  7/17/2017

## 2017-07-18 ENCOUNTER — TELEPHONE (OUTPATIENT)
Dept: NEUROLOGY | Facility: CLINIC | Age: 67
End: 2017-07-18

## 2017-07-18 NOTE — TELEPHONE ENCOUNTER
Talked with wife to introduce myself and assess support needs. She said they are doing well at this time, continuing with speech therapy and this seems to be going well. She asked that I email her my contact info, which I sent. I look forward to talking with them as they need.

## 2017-07-24 ENCOUNTER — HOSPITAL ENCOUNTER (OUTPATIENT)
Dept: SPEECH THERAPY | Facility: HOSPITAL | Age: 67
Setting detail: THERAPIES SERIES
Discharge: HOME | End: 2017-07-24

## 2017-07-24 DIAGNOSIS — G31.84 MILD COGNITIVE IMPAIRMENT: Primary | ICD-10-CM

## 2017-07-24 PROCEDURE — 92507 TX SP LANG VOICE COMM INDIV: CPT

## 2017-07-24 NOTE — THERAPY TREATMENT NOTE
Outpatient Speech Language Pathology   Adult Speech Language Cognitive Treatment Note  [facility]     Patient Name: Christo Prince  : 1950  MRN: [id number]  Today's Date: 2017         Visit Date: 2017   Patient Active Problem List   Diagnosis   • Benign essential hypertension   • Gastroesophageal reflux disease   • CAD in native artery   • Fahr's disease   • S/P CABG x 5   • Mild cognitive impairment          Visit Dx:    ICD-10-CM ICD-9-CM   1. Mild cognitive impairment G31.84 331.83                               SLP OP Goals       17 0800       Goal Type Needed    Goal Type Needed Verbal Expression;Memory;Other Adult Goals  -HG     Subjective Comments    Subjective Comments Pt alert, cooperative, pt states that he feels a lot better about his memory and that it's better than it was.   -HG     Verbal Expression Goals    Verbal Expression LTG's Patient will be able to use verbal expressive language skills to communicate effectively in all situations with unfamiliar listener  -HG     Patient will be able to use verbal expressive language skills to communicate effectively in all situations with unfamiliar listener 90%:;with intermittent cues  -HG     Status: Patient will be able to use verbal expressive language skills to communicate effectively in all situations with unfamiliar listener Progressing as expected  -HG     Comments: Patient will be able to use verbal expressive language skills to communicate effectively in all situations with unfamiliar listener 17: Pt wsa 9/10 on trial 1 in 60 seconds of divergent naming of concrete items. T2: 7/10/17: Pt completed a divergent naming task for homework with 80% accuracy.   -HG     Patient will improve verbal expressive language skills by describing single/multiple similarities and differences between two target items 80%:;with cues  -HG     Status: Patient will improve verbal expressive language skills by describing single/multiple  similarities and differences between two target items Progressing as expected  -HG     Comments: Patient will improve verbal expressive language skills by describing single/multiple similarities and differences between two target items 7/17/17: For mental manipulation, opposites with a delay and pt was 80% accurate. 7/10/17: Similarities and Differences: 70% accurate.   -HG     Patient will improve verbal expressive language skills by completing divergent naming tasks 90%:;with cues  -HG     Status: Patient will improve verbal expressive language skills by completing divergent naming tasks Progressing as expected  -HG     Comments: Patient will improve verbal expressive language skills by completing divergent naming tasks 7/24/17: For 10 item category naming, pt was 7/10 Independently. 7/17/17: For 10 item category naming, pt was 10/10 x 2 7/10/17: Homework for divergent concrete category naming and pt was 80% accurate.   -HG     Patient will improve verbal expressive language skills by listing words associated to target word provided 80%  -HG     Status: Patient will improve verbal expressive language skills by listing words associated to target word provided Progressing as expected  -HG     Comments: Patient will improve verbal expressive language skills by listing words associated to target word provided 7/24/17: Pt was 25-50% accurate this date with mod verbal cues. 7/14/17: Pt was 50% accurate Independently.   -HG     Patient will improve verbal expressive language skills by describing attributes, function, action and/or uses of an object/item 80%:;with cues  -HG     Status: Patient will improve verbal expressive language skills by describing attributes, function, action and/or uses of an object/item New  -HG     Patient will improve verbal expressive language skills by completing convergent naming tasks 80%:;with cues  -HG     Status: Patient will improve verbal expressive language skills by completing  convergent naming tasks Progressing as expected  -HG     Comments: Patient will improve verbal expressive language skills by completing convergent naming tasks 7/17/17: For convergent category homework, pt was 70% accurate. 7/14/17: Convergent naming for concrete: pt was 80% accurate. 7/10/17: Convergent naming: pt was 50% accurate.   -HG     Memory Goals    Patient and family will implement compensatory strategies to maximize patient’s Memory function so patient can continue to participate in daily activities 90%:;with intermittent cues  -HG     Status: Patient and family will implement compensatory strategies to maximize patient’s Memory function so patient can continue to participate in daily activities Progressing as expected  -HG     Comments: Patient and family will implement compensatory strategies to maximize patient’s Memory function so patient can continue to participate in daily activities 7/17/17: Pt this date agreed that he needs to use a notepad and would use one- SLP to provide. 7/14/17: Pt stated that he doesn't want to use a notepad or notebook to write happenings down at this time. 7/10/17: Pt stated he used visualization at home when working on divergent naming task.   -HG     Patient will demonstrate improved ability to recall information by immediately recalling a series of words 80%:;related;with intermittent cues  -HG     Status: Patient will demonstrate improved ability to recall information by immediately recalling a series of words Progressing as expected  -HG     Comments: Patient will demonstrate improved ability to recall information by immediately recalling a series of words 7/17/17: For 4 word mental manipulation reverse order, pt was 75% accurate.  7/14/17: 3 related word recall and pt was 3/3 x 2; 4 related words:  7/10/17: 3 word: 3/3x 6, 4 word: 3/3 x 6. 3 word reverse order: pt was 90% accurate, 4 word reversal: 70% accuracy.   -HG     Patient’s memory skills will be enhanced as  reported by patient by utilizing internal memory strategies to recall up to 3 pieces of information after a 5- minute delay 80%:;with intermittent cues  -HG     Status: Patient’s memory skills will be enhanced as reported by patient by utilizing internal memory strategies to recall up to 3 pieces of information after a 5- minute delay Progressing as expected  -HG     Comments: Patient’s memory skills will be enhanced as reported by patient by utilizing internal memory strategies to recall up to 3 pieces of information after a 5- minute delay 7/24/17: Pt acheived 6/6 associated word recall with a delay  x 3. Varied list started at 4 words this date. 7/17/17: For 4 word recall, pt was 4/4 with no cues using linking and rehearsal. For 5 word recall, pt was 4/5 with written and linking strategy. 7/14/17: Progressed to 4 related word recall and pt was 3/4 x 2 with cues required. 7/10/17: Three related words: pt was 3/3 and used association for recall purposes  -HG     Status: Patient’s memory skills will be enhanced as reported by patient by using external memory aides Progressing as expected  -HG     Comments: Patient’s memory skills will be enhanced as reported by patient by using external memory aides 7/24/17: For 3 word order mental manipulation, pt was 100% accurate. For number reversal, for 3, pt was 100% accurate and for 4 numbers, pt was 80% accurate.  7/14/17: 4 word mental manipulation, reverse order and pt was 60% accurate. 7/10/17: For 3 and 4 word mental manipulation, grouping of items was used and pt's accuracy improved by 10-15%.   -HG     Other Goals    Other Adult Goal- 1 Pt will participate in one social activity a week in order to improve his functional language at conversation level as reported by the wife.  -HG     Status: Other Adult Goal- 1 Progressing as expected  -HG     Comments: Other Adult Goal- 1 7/24/17: Pt stated that he did take a walk and met a man sitting on the sidewalk. 7/17/17: Pt  stated he went to [employer] and none of the [employer] group was there. 7/14/17: Pt stated he had attended [employer] this past Sunday and that he and wife are still walking. Challenged to join the men's group at [employer] like he used to do.   -HG     Other Adult Goal- 2 Pt will improve RBANS score to at least the low average range across all assessments.   -HG     Status: Other Adult Goal- 2 New  -HG     SLP Time Calculation    SLP Goal Re-Cert Due Date 08/06/17  -HG       User Key  (r) = Recorded By, (t) = Taken By, (c) = Cosigned By    Initials Name Provider Type    LONDON Arreola MS CCC-SLP Speech and Language Pathologist                OP SLP Education       07/24/17 0800    Education    Education Comments Pt provided homework for word finding and recall.   -HG      User Key  (r) = Recorded By, (t) = Taken By, (c) = Cosigned By    Initials Name Effective Dates    LONDON Arreola MS CCC-SLP 06/22/15 -                 OP SLP Assessment/Plan - 07/24/17 0800     SLP Plan    Plan Comments Cont with cog tx.   -HG      User Key  (r) = Recorded By, (t) = Taken By, (c) = Cosigned By    Initials Name Provider Type    LONDON Arreola MS CCC-SLP Speech and Language Pathologist                 Time Calculation:   SLP Start Time: 0800    Therapy Charges for Today     Code Description Service Date Service Provider Modifiers Qty    [id number] HC ST TREATMENT SPEECH 4 7/24/2017 Jennifer Arreola MS CCC-SLP GN 1                   Jennifer Arreola MS CCC-SLP  7/24/2017

## 2017-07-28 ENCOUNTER — HOSPITAL ENCOUNTER (OUTPATIENT)
Dept: SPEECH THERAPY | Facility: HOSPITAL | Age: 67
Setting detail: THERAPIES SERIES
Discharge: HOME | End: 2017-07-28

## 2017-07-28 DIAGNOSIS — G31.84 MILD COGNITIVE IMPAIRMENT: Primary | ICD-10-CM

## 2017-07-28 PROBLEM — G43.909 MIGRAINES: Status: ACTIVE | Noted: 2017-07-28

## 2017-07-28 PROCEDURE — 92507 TX SP LANG VOICE COMM INDIV: CPT

## 2017-07-28 NOTE — THERAPY TREATMENT NOTE
Outpatient Speech Language Pathology   Adult Speech Language Cognitive Treatment Note  [facility]     Patient Name: Christo Prince  : 1950  MRN: [id number]  Today's Date: 2017         Visit Date: 2017   Patient Active Problem List   Diagnosis   • Benign essential hypertension   • Gastroesophageal reflux disease   • CAD in native artery   • Fahr's disease   • S/P CABG x 5   • Mild cognitive impairment   • Migraines          Visit Dx:    ICD-10-CM ICD-9-CM   1. Mild cognitive impairment G31.84 331.83                               SLP OP Goals       17 0845       Goal Type Needed    Goal Type Needed Verbal Expression;Memory;Other Adult Goals  -HG     Subjective Comments    Subjective Comments Pt alert, cooperative, took an outing to [employer]   -HG     Verbal Expression Goals    Verbal Expression LTG's Patient will be able to use verbal expressive language skills to communicate effectively in all situations with unfamiliar listener  -HG     Patient will be able to use verbal expressive language skills to communicate effectively in all situations with unfamiliar listener 90%:;with intermittent cues  -HG     Status: Patient will be able to use verbal expressive language skills to communicate effectively in all situations with unfamiliar listener Progressing as expected  -HG     Comments: Patient will be able to use verbal expressive language skills to communicate effectively in all situations with unfamiliar listener 17: Pt wsa 9/10 on trial 1 in 60 seconds of divergent naming of concrete items. T2: 7/10/17: Pt completed a divergent naming task for homework with 80% accuracy.   -HG     Patient will improve verbal expressive language skills by describing single/multiple similarities and differences between two target items 80%:;with cues  -HG     Status: Patient will improve verbal expressive language skills by describing single/multiple similarities and differences between two target items  Progressing as expected  -HG     Comments: Patient will improve verbal expressive language skills by describing single/multiple similarities and differences between two target items 7/17/17: For mental manipulation, opposites with a delay and pt was 80% accurate. 7/10/17: Similarities and Differences: 70% accurate.   -HG     Patient will improve verbal expressive language skills by completing divergent naming tasks 90%:;with cues  -HG     Status: Patient will improve verbal expressive language skills by completing divergent naming tasks Progressing as expected  -HG     Comments: Patient will improve verbal expressive language skills by completing divergent naming tasks 7/24/17: For 10 item category naming, pt was 7/10 Independently. 7/17/17: For 10 item category naming, pt was 10/10 x 2 7/10/17: Homework for divergent concrete category naming and pt was 80% accurate.   -HG     Patient will improve verbal expressive language skills by listing words associated to target word provided 80%  -HG     Status: Patient will improve verbal expressive language skills by listing words associated to target word provided Progressing as expected  -HG     Comments: Patient will improve verbal expressive language skills by listing words associated to target word provided 7/28/17: Pt was 70% accurate this date for homework turned in. 7/24/17: Pt was 25-50% accurate this date with mod verbal cues. 7/14/17: Pt was 50% accurate Independently.   -HG     Patient will improve verbal expressive language skills by describing attributes, function, action and/or uses of an object/item 80%:;with cues  -HG     Status: Patient will improve verbal expressive language skills by describing attributes, function, action and/or uses of an object/item Progressing as expected  -HG     Comments: Patient will improve verbal expressive language skills by describing attributes, function, action and/or uses of an object/item 7/28/17: For object description:  pt was 50% accurate independently.   -HG     Patient will improve verbal expressive language skills by completing convergent naming tasks 80%:;with cues  -HG     Status: Patient will improve verbal expressive language skills by completing convergent naming tasks Progressing as expected  -HG     Comments: Patient will improve verbal expressive language skills by completing convergent naming tasks 7/28/17: For 10 items, pt was 8/10. 7/17/17: For convergent category homework, pt was 70% accurate. 7/14/17: Convergent naming for concrete: pt was 80% accurate. 7/10/17: Convergent naming: pt was 50% accurate.   -HG     Memory Goals    Patient and family will implement compensatory strategies to maximize patient’s Memory function so patient can continue to participate in daily activities 90%:;with intermittent cues  -HG     Status: Patient and family will implement compensatory strategies to maximize patient’s Memory function so patient can continue to participate in daily activities Progressing as expected  -HG     Comments: Patient and family will implement compensatory strategies to maximize patient’s Memory function so patient can continue to participate in daily activities 7/28/17: Provided pt a notebook and journal entry began this date and pt educated on examples for the journal. 7/17/17: Pt this date agreed that he needs to use a notepad and would use one- SLP to provide. 7/14/17: Pt stated that he doesn't want to use a notepad or notebook to write happenings down at this time. 7/10/17: Pt stated he used visualization at home when working on divergent naming task.   -HG     Patient will demonstrate improved ability to recall information by immediately recalling a series of words 80%:;related;with intermittent cues  -HG     Status: Patient will demonstrate improved ability to recall information by immediately recalling a series of words Progressing as expected  -HG     Comments: Patient will demonstrate improved  ability to recall information by immediately recalling a series of words 7/28/17: Mental manipulation for 3 number varied ordering, pt was 80% accurate. 7/17/17: For 4 word mental manipulation reverse order, pt was 75% accurate.  7/14/17: 3 related word recall and pt was 3/3 x 2; 4 related words:  7/10/17: 3 word: 3/3x 6, 4 word: 3/3 x 6. 3 word reverse order: pt was 90% accurate, 4 word reversal: 70% accuracy.   -HG     Patient’s memory skills will be enhanced as reported by patient by utilizing internal memory strategies to recall up to 3 pieces of information after a 5- minute delay 80%:;with intermittent cues  -HG     Status: Patient’s memory skills will be enhanced as reported by patient by utilizing internal memory strategies to recall up to 3 pieces of information after a 5- minute delay Progressing as expected  -HG     Comments: Patient’s memory skills will be enhanced as reported by patient by utilizing internal memory strategies to recall up to 3 pieces of information after a 5- minute delay 7/28/17: For 4 un-related word recall, pt was 4/4 x 2. 5 un-related words, T1: 3/5, T2: 4/5, T3: 5/5 with reps T4: 4/5.    7/24/17: Pt acheived 6/6 associated word recall with a delay  x 3. Varied list started at 4 words this date. 7/17/17: For 4 word recall, pt was 4/4 with no cues using linking and rehearsal. For 5 word recall, pt was 4/5 with written and linking strategy. 7/14/17: Progressed to 4 related word recall and pt was 3/4 x 2 with cues required. 7/10/17: Three related words: pt was 3/3 and used association for recall purposes  -HG     Status: Patient’s memory skills will be enhanced as reported by patient by using external memory aides Progressing as expected  -HG     Comments: Patient’s memory skills will be enhanced as reported by patient by using external memory aides 7/24/17: For 3 word order mental manipulation, pt was 100% accurate. For number reversal, for 3, pt was 100% accurate and for 4 numbers,  pt was 80% accurate.  7/14/17: 4 word mental manipulation, reverse order and pt was 60% accurate. 7/10/17: For 3 and 4 word mental manipulation, grouping of items was used and pt's accuracy improved by 10-15%.   -HG     Other Goals    Other Adult Goal- 1 Pt will participate in one social activity a week in order to improve his functional language at conversation level as reported by the wife.  -HG     Status: Other Adult Goal- 1 Progressing as expected  -HG     Comments: Other Adult Goal- 1 7/28/17: Pt went to [employer] and attempted to be social, asked to sit with men he didn't know. 7/24/17: Pt stated that he did take a walk and met a man sitting on the sidewalk. 7/17/17: Pt stated he went to [employer] and none of the [employer] group was there. 7/14/17: Pt stated he had attended [employer] this past Sunday and that he and wife are still walking. Challenged to join the men's group at [employer] like he used to do.   -HG     Other Adult Goal- 2 Pt will improve RBANS score to at least the low average range across all assessments.   -HG     Status: Other Adult Goal- 2 New  -HG     SLP Time Calculation    SLP Goal Re-Cert Due Date 08/06/17  -HG       User Key  (r) = Recorded By, (t) = Taken By, (c) = Cosigned By    Initials Name Provider Type    LONDON Arreola MS [facility]-SLP Speech and Language Pathologist                OP SLP Education       07/28/17 0845    Education    Education Comments Pt provided with homework for recall and word finding.   -HG      User Key  (r) = Recorded By, (t) = Taken By, (c) = Cosigned By    Initials Name Effective Dates    LONDON Arreola MS [facility]-SLP 06/22/15 -                 OP SLP Assessment/Plan - 07/28/17 0900     SLP Plan    Plan Comments --  -HG      User Key  (r) = Recorded By, (t) = Taken By, (c) = Cosigned By    Initials Name Provider Type    LONDON Arreola MS [facility]-SLP Speech and Language Pathologist                 Time Calculation:   SLP Start Time: 0845    Therapy  Charges for Today     Code Description Service Date Service Provider Modifiers Qty    [id number]  ST TREATMENT SPEECH 4 7/28/2017 Jennifer Arreola, MS CCC-SLP GN 1                   Jennifer Arreola MS CCC-SLP  7/28/2017

## 2017-07-31 ENCOUNTER — HOSPITAL ENCOUNTER (OUTPATIENT)
Dept: SPEECH THERAPY | Facility: HOSPITAL | Age: 67
Setting detail: THERAPIES SERIES
Discharge: HOME | End: 2017-07-31

## 2017-07-31 ENCOUNTER — OFFICE VISIT (OUTPATIENT)
Dept: NEUROLOGY | Facility: CLINIC | Age: 67
End: 2017-07-31

## 2017-07-31 VITALS — WEIGHT: 191 LBS | BODY MASS INDEX: 26.74 KG/M2 | HEIGHT: 71 IN

## 2017-07-31 DIAGNOSIS — G31.84 MILD COGNITIVE IMPAIRMENT: Primary | ICD-10-CM

## 2017-07-31 PROCEDURE — 99213 OFFICE O/P EST LOW 20 MIN: CPT | Performed by: PHYSICIAN ASSISTANT

## 2017-07-31 PROCEDURE — 92507 TX SP LANG VOICE COMM INDIV: CPT

## 2017-07-31 RX ORDER — MEMANTINE HYDROCHLORIDE 10 MG/1
10 TABLET ORAL 2 TIMES DAILY
Qty: 60 TABLET | Refills: 11 | Status: SHIPPED | OUTPATIENT
Start: 2017-07-31 | End: 2018-08-04 | Stop reason: SDUPTHER

## 2017-07-31 RX ORDER — DONEPEZIL HYDROCHLORIDE 10 MG/1
10 TABLET, FILM COATED ORAL DAILY
Qty: 30 TABLET | Refills: 11 | Status: SHIPPED | OUTPATIENT
Start: 2017-07-31 | End: 2018-08-03 | Stop reason: SDUPTHER

## 2017-07-31 NOTE — PROGRESS NOTES
"Subjective     History of Present Illness   Christo Prince is a 67 y.o. male who returns to clinic today for evaluation of cognitive impairment. He has noted symptoms since July, 2016 after CABG at [facility] marked primarily by word-finding difficulties . His comprehension seems intact, and he is not making paraphasic errors. This has possible worsening over time. Additional associated symptoms have included impairments in executive function. His symptoms are worsened when under stress. He is currently residing at home with his wife in [place].      Prior evaluation has included screening blood work  which was unremarkable . 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 He is currently taking donepezil 5mg daily. He is currently participating in cognitive rehabilitation.    Since his last visit in 6/17, he and his family notes that his word-finding difficulties have improved.       The following portions of the patient's history were reviewed and updated as appropriate: allergies, current medications, past family history, past medical history, past social history, past surgical history and problem list.    Review of Systems   Constitutional: Negative.    HENT: Negative.    Eyes: Negative.    Respiratory: Negative.    Cardiovascular: Negative.    Gastrointestinal: Negative.    Endocrine: Negative.    Genitourinary: Negative.    Musculoskeletal: Negative.    Skin: Negative.    Allergic/Immunologic: Negative.    Hematological: Negative.    Psychiatric/Behavioral: Negative.        Objective     Ht 71\" (180.3 cm)  Wt 191 lb (86.6 kg)  BMI 26.64 kg/m2    General appearance today is normal.         Physical Exam   Constitutional: He is oriented to person, place, and time.   Neurological: He is oriented to person, place, and time. He has normal strength. He has a normal Finger-Nose-Finger Test.   Psychiatric: His speech is normal.        Neurologic Exam     Mental Status "   Oriented to person, place, and time.   Registration: recalls 3 of 3 objects. Recall at 5 minutes: recalls 2 of 3 objects. Follows 3 step commands.   Attention: normal.   Speech: speech is normal   Level of consciousness: alert  Able to name object. Able to read. Able to repeat. Able to write. Normal comprehension.     Cranial Nerves   Cranial nerves II through XII intact.     Motor Exam   Muscle bulk: normal  Overall muscle tone: normal    Strength   Strength 5/5 throughout.     Sensory Exam   Light touch normal.     Gait, Coordination, and Reflexes     Coordination   Finger to nose coordination: normal    Tremor   Resting tremor: absent        Results  MMSE=29      Assessment/Plan   Christo was seen today for memory loss.    Diagnoses and all orders for this visit:    Mild cognitive impairment    Other orders  -     donepezil (ARICEPT) 10 MG tablet; Take 1 tablet by mouth Daily.  -     memantine (NAMENDA) 10 MG tablet; Take 1 tablet by mouth 2 (Two) Times a Day.          Discussion/Summary   Christo Prince returns to clinic today for evaluation of cognitive impairment. His history and examination, including bedside cognitive testing are most consistent with Mild Cognitive Impairment (MCI) , which was discussed. This may be due to prior stroke, and VCI, though we will need to follow this to make sure this does not progress into another disorder. I again reviewed his current status and treatment options. After discussing potential treatment options, it was elected to increase his donepezil to 10mg daily and add memantine. He will then follow up in 3 months, or sooner if needed.       I spent 15 minutes out of 20 minutes face to face with the patient and family and discussing evaluation, current status, treatment options and management.    As part of this visit I reviewed prior lab results and obtained additional history from the family which is incorporated in the HPI.      Marissa Hagan PA-C

## 2017-08-04 ENCOUNTER — HOSPITAL ENCOUNTER (OUTPATIENT)
Dept: SPEECH THERAPY | Facility: HOSPITAL | Age: 67
Setting detail: THERAPIES SERIES
Discharge: HOME | End: 2017-08-04

## 2017-08-04 DIAGNOSIS — G31.84 MILD COGNITIVE IMPAIRMENT: Primary | ICD-10-CM

## 2017-08-04 PROCEDURE — 92507 TX SP LANG VOICE COMM INDIV: CPT

## 2017-08-04 PROCEDURE — G9168 MEMORY CURRENT STATUS: HCPCS

## 2017-08-04 PROCEDURE — G9169 MEMORY GOAL STATUS: HCPCS

## 2017-09-11 ENCOUNTER — HOSPITAL ENCOUNTER (OUTPATIENT)
Dept: SPEECH THERAPY | Facility: HOSPITAL | Age: 67
Setting detail: THERAPIES SERIES
Discharge: HOME | End: 2017-09-11

## 2017-09-11 DIAGNOSIS — G31.84 MILD COGNITIVE IMPAIRMENT: Primary | ICD-10-CM

## 2017-09-11 PROCEDURE — G9168 MEMORY CURRENT STATUS: HCPCS

## 2017-09-11 PROCEDURE — 92507 TX SP LANG VOICE COMM INDIV: CPT

## 2017-09-11 PROCEDURE — G9169 MEMORY GOAL STATUS: HCPCS

## 2017-09-11 NOTE — THERAPY PROGRESS REPORT/RE-CERT
Outpatient Speech Language Pathology   Adult Speech Language Cognitive Progress Note  [facility]     Patient Name: Christo Prince  : 1950  MRN: [id number]  Today's Date: 2017         Visit Date: 2017   Patient Active Problem List   Diagnosis   • Benign essential hypertension   • Gastroesophageal reflux disease   • CAD in native artery   • Fahr's disease   • S/P CABG x 5   • Mild cognitive impairment   • Migraines          Visit Dx:    ICD-10-CM ICD-9-CM   1. Mild cognitive impairment G31.84 331.83                               SLP OP Goals       17 0900       Goal Type Needed    Goal Type Needed Verbal Expression;Memory;Other Adult Goals  -HG     Subjective Comments    Subjective Comments Pt alert, cooperative and stated that he feels he is a burden to his family.   -HG     Verbal Expression Goals    Verbal Expression LTG's Patient will be able to use verbal expressive language skills to communicate effectively in all situations with unfamiliar listener  -HG     Patient will be able to use verbal expressive language skills to communicate effectively in all situations with unfamiliar listener 90%:;with intermittent cues  -HG     Status: Patient will be able to use verbal expressive language skills to communicate effectively in all situations with unfamiliar listener Progressing as expected  -HG     Comments: Patient will be able to use verbal expressive language skills to communicate effectively in all situations with unfamiliar listener 17: Verbal sequencin% for familiar topic. 17: Pt was asked to complete math problems quickly in order to improve his response time and pt was 90% accurate. 17: Pt wsa 9/10 on trial 1 in 60 seconds of divergent naming of concrete items. T2: 7/10/17: Pt completed a divergent naming task for homework with 80% accuracy.   -HG     Patient will improve verbal expressive language skills by describing single/multiple similarities and differences  between two target items 80%:;with cues  -HG     Status: Patient will improve verbal expressive language skills by describing single/multiple similarities and differences between two target items Progressing as expected  -HG     Comments: Patient will improve verbal expressive language skills by describing single/multiple similarities and differences between two target items 17: MOCA score for simliarity was 2/2. 17: For mental manipulation, opposites with a delay and pt was 80% accurate. 7/10/17: Similarities and Differences: 70% accurate.   -HG     Patient will improve verbal expressive language skills by completing divergent naming tasks 90%:;with cues  -HG     Status: Patient will improve verbal expressive language skills by completing divergent naming tasks Progressing as expected  -HG     Comments: Patient will improve verbal expressive language skills by completing divergent naming tasks 17: Divergent namin17: For 10 item category naming, pt was 7/10 Independently. 17: For 10 item category naming, pt was 1010 x 2 7/10/17: Homework for divergent concrete category naming and pt was 80% accurate.   -HG     Patient will improve verbal expressive language skills by listing words associated to target word provided 80%  -HG     Status: Patient will improve verbal expressive language skills by listing words associated to target word provided Progressing as expected  -HG     Comments: Patient will improve verbal expressive language skills by listing words associated to target word provided 17: MOCA fluency/naming score was 25%. 17: Pt was 70% accurate this date for homework turned in. 17: Pt was 25-50% accurate this date with mod verbal cues. 17: Pt was 50% accurate Independently.   -HG     Patient will improve verbal expressive language skills by describing attributes, function, action and/or uses of an object/item 80%:;with cues  -HG     Status: Patient will improve  verbal expressive language skills by describing attributes, function, action and/or uses of an object/item Progressing as expected  -HG     Comments: Patient will improve verbal expressive language skills by describing attributes, function, action and/or uses of an object/item 7/31/17: For definitions, pt was 70% accurate. 7/28/17: For object description: pt was 50% accurate independently.   -HG     Patient will improve verbal expressive language skills by completing convergent naming tasks 80%:;with cues  -HG     Status: Patient will improve verbal expressive language skills by completing convergent naming tasks Progressing as expected  -HG     Comments: Patient will improve verbal expressive language skills by completing convergent naming tasks 7/31/17: Divergent naming and pt was 7/10.  7/28/17: For 10 items, pt was 8/10. 7/17/17: For convergent category homework, pt was 70% accurate. 7/14/17: Convergent naming for concrete: pt was 80% accurate. 7/10/17: Convergent naming: pt was 50% accurate.   -HG     Memory Goals    Patient and family will implement compensatory strategies to maximize patient’s Memory function so patient can continue to participate in daily activities 90%:;with intermittent cues  -HG     Status: Patient and family will implement compensatory strategies to maximize patient’s Memory function so patient can continue to participate in daily activities Progressing as expected  -HG     Comments: Patient and family will implement compensatory strategies to maximize patient’s Memory function so patient can continue to participate in daily activities 9/11/17: Pt not seen for one month and claims to not be writing in his journal. 7/31/17: Pt returned to session with entries from over the weekend. 7/28/17: Provided pt a notebook and journal entry began this date and pt educated on examples for the journal. 7/17/17: Pt this date agreed that he needs to use a notepad and would use one- SLP to provide.  7/14/17: Pt stated that he doesn't want to use a notepad or notebook to write happenings down at this time. 7/10/17: Pt stated he used visualization at home when working on divergent naming task.   -HG     Patient will demonstrate improved ability to recall information by immediately recalling a series of words 80%:;related;with intermittent cues  -HG     Status: Patient will demonstrate improved ability to recall information by immediately recalling a series of words Progressing as expected  -HG     Comments: Patient will demonstrate improved ability to recall information by immediately recalling a series of words 9/11/17: Mental manipulation for word list retention: pt was 25% accurate.  7/28/17: Mental manipulation for 3 number varied ordering, pt was 80% accurate. 7/17/17: For 4 word mental manipulation reverse order, pt was 75% accurate.  7/14/17: 3 related word recall and pt was 3/3 x 2; 4 related words:  7/10/17: 3 word: 3/3x 6, 4 word: 3/3 x 6. 3 word reverse order: pt was 90% accurate, 4 word reversal: 70% accuracy.   -HG     Patient’s memory skills will be enhanced as reported by patient by utilizing internal memory strategies to recall up to 3 pieces of information after a 5- minute delay 80%:;with intermittent cues  -HG     Status: Patient’s memory skills will be enhanced as reported by patient by utilizing internal memory strategies to recall up to 3 pieces of information after a 5- minute delay Progressing as expected  -HG     Comments: Patient’s memory skills will be enhanced as reported by patient by utilizing internal memory strategies to recall up to 3 pieces of information after a 5- minute delay 9/11/17: For 5 un-related words, pt was 0/5 for the MOCA and after rehearsal and strategies, pt improved to 4/5. 7/31/17: For 5 un-related words, pt was 4/5 with no review of words from previous sessions on T1; T2: 4/5 ,T3: 5/5. 7/28/17: For 4 un-related word recall, pt was 4/4 x 2. 5 un-related words,  "T1: 3/5, T2: 4/5, T3: 5/5 with reps T4: 4/5.    7/24/17: Pt acheived 6/6 associated word recall with a delay  x 3. Varied list started at 4 words this date. 7/17/17: For 4 word recall, pt was 4/4 with no cues using linking and rehearsal. For 5 word recall, pt was 4/5 with written and linking strategy. 7/14/17: Progressed to 4 related word recall and pt was 3/4 x 2 with cues required. 7/10/17: Three related words: pt was 3/3 and used association for recall purposes  -HG     Status: Patient’s memory skills will be enhanced as reported by patient by using external memory aides Progressing as expected  -HG     Comments: Patient’s memory skills will be enhanced as reported by patient by using external memory aides 7/24/17: For 3 word order mental manipulation, pt was 100% accurate. For number reversal, for 3, pt was 100% accurate and for 4 numbers, pt was 80% accurate.  7/14/17: 4 word mental manipulation, reverse order and pt was 60% accurate. 7/10/17: For 3 and 4 word mental manipulation, grouping of items was used and pt's accuracy improved by 10-15%.   -HG     Other Goals    Other Adult Goal- 1 Pt will participate in one social activity a week in order to improve his functional language at conversation level as reported by the wife.  -HG     Status: Other Adult Goal- 1 Progressing as expected  -HG     Comments: Other Adult Goal- 1 9/11/17: Pt stated, \"No, stagnant\" in response to if he has been going out and doing anything socially. 7/31/17: Pt did not go to [employer] for coffee with men and had no 7/28/17: Pt went to [employer] and attempted to be social, asked to sit with men he didn't know. 7/24/17: Pt stated that he did take a walk and met a man sitting on the sidewalk. 7/17/17: Pt stated he went to [employer] and none of the [employer] group was there. 7/14/17: Pt stated he had attended [employer] this past Sunday and that he and wife are still walking. Challenged to join the men's group at [employer] like he used to " do.   -HG     Other Adult Goal- 2 Pt will improve RBANS score to at least the low average range across all assessments.   -HG     Status: Other Adult Goal- 2 Progressing as expected  -     Comments: Other Adult Goal- 2 9/11/17: MOCA score of 21/30 is outside of Normal Range and therefore reveals deficits consistent with previous RBANS assessment. 8/4/17: RBANS re-administered this date with overall score improving to a 351 from a 347.   -     SLP Time Calculation    SLP Goal Re-Cert Due Date 10/11/17  -       User Key  (r) = Recorded By, (t) = Taken By, (c) = Cosigned By    Initials Name Provider Type     Jennifer Arreola MS [facility]-SLP Speech and Language Pathologist                OP SLP Education       09/11/17 0900    Education    Barriers to Learning No barriers identified  -    Education Provided Described results of evaluation;Patient expressed understanding of evaluation;Patient participated in establishing goals and treatment plan;Patient demonstrated recommended strategies;Patient requires further education on strategies, risks  -    Assessed Learning needs;Learning motivation;Learning preferences;Learning readiness  -    Learning Motivation Strong  -    Learning Method Explanation;Demonstration;Teach back;Written materials  -    Teaching Response Verbalized understanding;Demonstrated understanding;Reinforcement needed  -      User Key  (r) = Recorded By, (t) = Taken By, (c) = Cosigned By    Initials Name Effective Dates     Jennifer Arreola MS CCC-SLP 06/22/15 -                 OP SLP Assessment/Plan - 09/11/17 0900     SLP Assessment    Functional Problems Speech Language- Adult/Cognition  -    Impact on Function: Adult Speech Language/Cognition Difficulty communicating wants, needs, and ideas;Restrictions in personal and social life;Difficulty sequencing thoughts to express complex messages;Difficulty participating in avocational activities;Trouble learning or remembering new  information;Poor attention to task  -HG    Clinical Impression: Speech Language-Adult/Congnition Moderate-Severe:;Cognitive Communication Impairment  -HG    Functional Problems Comment Pt feels he is a burden to his family which impacts his cognitive abilities.  -HG    Clinical Impression Comments MOCA score of 21 represents deficits.   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 SLP INDIVIDUAL SPEECH THERAPY: 17929  -    Expected Duration Therapy Session (min) 45-60 minutes  -HG    Plan Comments Cont with Cog tx.   -      User Key  (r) = Recorded By, (t) = Taken By, (c) = Cosigned By    Initials Name Provider Type     Jennifer Arreola MS [facility]-SLP Speech and Language Pathologist                 Time Calculation:   SLP Start Time: 0900    Therapy Charges for Today     Code Description Service Date Service Provider Modifiers Qty    25940427798 HC ST MEMORY CURRENT 9/11/2017 Jennifer Arreola MS CCC-SLP GN, CL 1    72385228661 HC ST MEMORY PROJECTED 9/11/2017 Jennifer Arreola MS CCC-SLP GN, CJ 1    [id number] HC ST TREATMENT SPEECH 4 9/11/2017 Jennifer Arreola MS CCC-SLP GN 1          SLP G-Codes  Functional Limitations: Memory  Memory Current Status (): At least 60 percent but less than 80 percent impaired, limited or restricted  Memory Goal Status (): At least 20 percent but less than 40 percent impaired, limited or restricted        Jennifer Arreola MS CCC-SLP  9/11/2017

## 2017-09-18 ENCOUNTER — HOSPITAL ENCOUNTER (OUTPATIENT)
Dept: SPEECH THERAPY | Facility: HOSPITAL | Age: 67
Setting detail: THERAPIES SERIES
Discharge: HOME | End: 2017-09-18

## 2017-09-18 DIAGNOSIS — G31.84 MILD COGNITIVE IMPAIRMENT: Primary | ICD-10-CM

## 2017-09-18 PROCEDURE — 92507 TX SP LANG VOICE COMM INDIV: CPT

## 2017-09-18 NOTE — THERAPY TREATMENT NOTE
Outpatient Speech Language Pathology   Adult Speech Language Cognitive Treatment Note  [facility]     Patient Name: Christo Prince  : 1950  MRN: [id number]  Today's Date: 2017         Visit Date: 2017   Patient Active Problem List   Diagnosis   • Benign essential hypertension   • Gastroesophageal reflux disease   • CAD in native artery   • Fahr's disease   • S/P CABG x 5   • Mild cognitive impairment   • Migraines          Visit Dx:    ICD-10-CM ICD-9-CM   1. Mild cognitive impairment G31.84 331.83                               SLP OP Goals       17 0800       Goal Type Needed    Goal Type Needed Verbal Expression;Memory;Other Adult Goals  -HG     Subjective Comments    Subjective Comments Pt alert, cooperative, feeling challenged this early in the morning.  -HG     Verbal Expression Goals    Verbal Expression LTG's Patient will be able to use verbal expressive language skills to communicate effectively in all situations with unfamiliar listener  -HG     Patient will be able to use verbal expressive language skills to communicate effectively in all situations with unfamiliar listener 90%:;with intermittent cues  -HG     Status: Patient will be able to use verbal expressive language skills to communicate effectively in all situations with unfamiliar listener Progressing as expected  -HG     Comments: Patient will be able to use verbal expressive language skills to communicate effectively in all situations with unfamiliar listener 17: More complex verbal sequencin% accurate with mod cues. 17: Verbal sequencin% for familiar topic. 17: Pt was asked to complete math problems quickly in order to improve his response time and pt was 90% accurate. 17: Pt wsa 9/10 on trial 1 in 60 seconds of divergent naming of concrete items. T2: 7/10/17: Pt completed a divergent naming task for homework with 80% accuracy.   -HG     Patient will improve verbal expressive language skills  by describing single/multiple similarities and differences between two target items 80%:;with cues  -HG     Status: Patient will improve verbal expressive language skills by describing single/multiple similarities and differences between two target items Progressing as expected  -HG     Comments: Patient will improve verbal expressive language skills by describing single/multiple similarities and differences between two target items 17: MOCA score for simliarity was 2/2. 17: For mental manipulation, opposites with a delay and pt was 80% accurate. 7/10/17: Similarities and Differences: 70% accurate.   -HG     Patient will improve verbal expressive language skills by completing divergent naming tasks 90%:;with cues  -HG     Status: Patient will improve verbal expressive language skills by completing divergent naming tasks Progressing as expected  -HG     Comments: Patient will improve verbal expressive language skills by completing divergent naming tasks 17: Homework for divergent naming and pt was 90% accurate. 17: Divergent namin17: For 10 item category naming, pt was 7/10 Independently. 17: For 10 item category naming, pt was 10/10 x 2 7/10/17: Homework for divergent concrete category naming and pt was 80% accurate.   -HG     Patient will improve verbal expressive language skills by listing words associated to target word provided 80%  -HG     Status: Patient will improve verbal expressive language skills by listing words associated to target word provided Progressing as expected  -HG     Comments: Patient will improve verbal expressive language skills by listing words associated to target word provided 17: 17: MOCA fluency/naming score was 25%. 17: Pt was 70% accurate this date for homework turned in. 17: Pt was 25-50% accurate this date with mod verbal cues. 17: Pt was 50% accurate Independently.   -HG     Patient will improve verbal expressive language  skills by describing attributes, function, action and/or uses of an object/item 80%:;with cues  -HG     Status: Patient will improve verbal expressive language skills by describing attributes, function, action and/or uses of an object/item Progressing as expected  -HG     Comments: Patient will improve verbal expressive language skills by describing attributes, function, action and/or uses of an object/item 7/31/17: For definitions, pt was 70% accurate. 7/28/17: For object description: pt was 50% accurate independently.   -HG     Patient will improve verbal expressive language skills by completing convergent naming tasks 80%:;with cues  -HG     Status: Patient will improve verbal expressive language skills by completing convergent naming tasks Progressing as expected  -HG     Comments: Patient will improve verbal expressive language skills by completing convergent naming tasks 7/31/17: Divergent naming and pt was 7/10.  7/28/17: For 10 items, pt was 8/10. 7/17/17: For convergent category homework, pt was 70% accurate. 7/14/17: Convergent naming for concrete: pt was 80% accurate. 7/10/17: Convergent naming: pt was 50% accurate.   -HG     Memory Goals    Patient and family will implement compensatory strategies to maximize patient’s Memory function so patient can continue to participate in daily activities 90%:;with intermittent cues  -HG     Status: Patient and family will implement compensatory strategies to maximize patient’s Memory function so patient can continue to participate in daily activities Progressing as expected  -HG     Comments: Patient and family will implement compensatory strategies to maximize patient’s Memory function so patient can continue to participate in daily activities 9/18/17: Pt brought in journal and had 5 entries from the last time I had seen him. 9/11/17: Pt not seen for one month and claims to not be writing in his journal. 7/31/17: Pt returned to session with entries from over the  weekend. 7/28/17: Provided pt a notebook and journal entry began this date and pt educated on examples for the journal. 7/17/17: Pt this date agreed that he needs to use a notepad and would use one- SLP to provide. 7/14/17: Pt stated that he doesn't want to use a notepad or notebook to write happenings down at this time. 7/10/17: Pt stated he used visualization at home when working on divergent naming task.   -HG     Patient will demonstrate improved ability to recall information by immediately recalling a series of words 80%:;related;with intermittent cues  -HG     Status: Patient will demonstrate improved ability to recall information by immediately recalling a series of words Progressing as expected  -HG     Comments: Patient will demonstrate improved ability to recall information by immediately recalling a series of words 9/11/17: Mental manipulation for word list retention: pt was 25% accurate.  7/28/17: Mental manipulation for 3 number varied ordering, pt was 80% accurate. 7/17/17: For 4 word mental manipulation reverse order, pt was 75% accurate.  7/14/17: 3 related word recall and pt was 3/3 x 2; 4 related words:  7/10/17: 3 word: 3/3x 6, 4 word: 3/3 x 6. 3 word reverse order: pt was 90% accurate, 4 word reversal: 70% accuracy.   -HG     Patient’s memory skills will be enhanced as reported by patient by utilizing internal memory strategies to recall up to 3 pieces of information after a 5- minute delay 80%:;with intermittent cues  -HG     Status: Patient’s memory skills will be enhanced as reported by patient by utilizing internal memory strategies to recall up to 3 pieces of information after a 5- minute delay Progressing as expected  -HG     Comments: Patient’s memory skills will be enhanced as reported by patient by utilizing internal memory strategies to recall up to 3 pieces of information after a 5- minute delay 9/18/17: For 5 un-related words, pt was T1: 4/5, T2: 4/5, T3: 4/5, T4: 5/5. 9/11/17: For 5  "un-related words, pt was 0/5 for the MOCA and after rehearsal and strategies, pt improved to 4/5. 7/31/17: For 5 un-related words, pt was 4/5 with no review of words from previous sessions on T1; T2: 4/5 ,T3: 5/5. 7/28/17: For 4 un-related word recall, pt was 4/4 x 2. 5 un-related words, T1: 3/5, T2: 4/5, T3: 5/5 with reps T4: 4/5.    7/24/17: Pt acheived 6/6 associated word recall with a delay  x 3. Varied list started at 4 words this date. 7/17/17: For 4 word recall, pt was 4/4 with no cues using linking and rehearsal. For 5 word recall, pt was 4/5 with written and linking strategy. 7/14/17: Progressed to 4 related word recall and pt was 3/4 x 2 with cues required. 7/10/17: Three related words: pt was 3/3 and used association for recall purposes  -HG     Status: Patient’s memory skills will be enhanced as reported by patient by using external memory aides Progressing as expected  -HG     Comments: Patient’s memory skills will be enhanced as reported by patient by using external memory aides 7/24/17: For 3 word order mental manipulation, pt was 100% accurate. For number reversal, for 3, pt was 100% accurate and for 4 numbers, pt was 80% accurate.  7/14/17: 4 word mental manipulation, reverse order and pt was 60% accurate. 7/10/17: For 3 and 4 word mental manipulation, grouping of items was used and pt's accuracy improved by 10-15%.   -HG     Other Goals    Other Adult Goal- 1 Pt will participate in one social activity a week in order to improve his functional language at conversation level as reported by the wife.  -HG     Status: Other Adult Goal- 1 Progressing as expected  -HG     Comments: Other Adult Goal- 1 9/18/17: Pt is attending [employer] and feels pretty comfortable with conversation skills in that setting. 9/11/17: Pt stated, \"No, stagnant\" in response to if he has been going out and doing anything socially. 7/31/17: Pt did not go to [employer] for coffee with men and had no 7/28/17: Pt went to [employer] " and attempted to be social, asked to sit with men he didn't know. 7/24/17: Pt stated that he did take a walk and met a man sitting on the sidewalk. 7/17/17: Pt stated he went to [employer] and none of the [employer] group was there. 7/14/17: Pt stated he had attended [employer] this past Sunday and that he and wife are still walking. Challenged to join the [employer] group at [employer] like he used to do.   -HG     Other Adult Goal- 2 Pt will improve RBANS score to at least the low average range across all assessments.   -HG     Status: Other Adult Goal- 2 Progressing as expected  -HG     Comments: Other Adult Goal- 2 9/11/17: MOCA score of 21/30 is outside of Normal Range and therefore reveals deficits consistent with previous RBANS assessment. 8/4/17: RBANS re-administered this date with overall score improving to a 351 from a 347.   -HG     SLP Time Calculation    SLP Goal Re-Cert Due Date 10/11/17  -HG       User Key  (r) = Recorded By, (t) = Taken By, (c) = Cosigned By    Initials Name Provider Type    HG Jennifer Arreola MS CCC-SLP Speech and Language Pathologist                OP SLP Education       09/18/17 0800    Education    Education Comments Pt given homework for recall and thought organization.   -HG      User Key  (r) = Recorded By, (t) = Taken By, (c) = Cosigned By    Initials Name Effective Dates    HG Jennifer Arreola MS CCC-SLP 06/22/15 -                 OP SLP Assessment/Plan - 09/18/17 0800     SLP Plan    Plan Comments Cont with Cog tx.   -HG      User Key  (r) = Recorded By, (t) = Taken By, (c) = Cosigned By    Initials Name Provider Type    [place] Jennifer Arreola, MS CCC-SLP Speech and Language Pathologist                 Time Calculation:   SLP Start Time: 0800    Therapy Charges for Today     Code Description Service Date Service Provider Modifiers Qty    [id number]  ST TREATMENT SPEECH 4 9/18/2017 Jennifer Arreola MS CCC-SLP GN 1                   Jennifer Arreola MS CCC-SLP  9/18/2017

## 2017-09-25 ENCOUNTER — HOSPITAL ENCOUNTER (OUTPATIENT)
Dept: SPEECH THERAPY | Facility: HOSPITAL | Age: 67
Setting detail: THERAPIES SERIES
Discharge: HOME | End: 2017-09-25

## 2017-09-25 DIAGNOSIS — G31.84 MILD COGNITIVE IMPAIRMENT: Primary | ICD-10-CM

## 2017-09-25 PROCEDURE — 92507 TX SP LANG VOICE COMM INDIV: CPT

## 2017-09-25 NOTE — THERAPY TREATMENT NOTE
Outpatient Speech Language Pathology   Adult Speech Language Cognitive Treatment Note  [facility]     Patient Name: Christo Prince  : 1950  MRN: [id number]  Today's Date: 2017         Visit Date: 2017   Patient Active Problem List   Diagnosis   • Benign essential hypertension   • Gastroesophageal reflux disease   • CAD in native artery   • Fahr's disease   • S/P CABG x 5   • Mild cognitive impairment   • Migraines          Visit Dx:    ICD-10-CM ICD-9-CM   1. Mild cognitive impairment G31.84 331.83                               SLP OP Goals       17 0800       Goal Type Needed    Goal Type Needed Verbal Expression;Memory;Other Adult Goals  -HG     Subjective Comments    Subjective Comments Pt alert, cooperative, didn't  complete homework as he didn't understand it.   -HG     Verbal Expression Goals    Verbal Expression LTG's Patient will be able to use verbal expressive language skills to communicate effectively in all situations with unfamiliar listener  -HG     Patient will be able to use verbal expressive language skills to communicate effectively in all situations with unfamiliar listener 90%:;with intermittent cues  -HG     Status: Patient will be able to use verbal expressive language skills to communicate effectively in all situations with unfamiliar listener Progressing as expected  -HG     Comments: Patient will be able to use verbal expressive language skills to communicate effectively in all situations with unfamiliar listener 17: Pt went to a birthday party over the recent weekend and it was 'nice, had a good time' and pt felt he was able to engage in conversation 70-80% of the time. 17: More complex verbal sequencin% accurate with mod cues. 17: Verbal sequencin% for familiar topic. 17: Pt was asked to complete math problems quickly in order to improve his response time and pt was 90% accurate. 17: Pt wsa /10 on trial 1 in 60 seconds of divergent  naming of concrete items. T2: 7/10/17: Pt completed a divergent naming task for homework with 80% accuracy.   -HG     Patient will improve verbal expressive language skills by describing single/multiple similarities and differences between two target items 80%:;with cues  -HG     Status: Patient will improve verbal expressive language skills by describing single/multiple similarities and differences between two target items Progressing as expected  -HG     Comments: Patient will improve verbal expressive language skills by describing single/multiple similarities and differences between two target items 17: Similarities and Differences: 75% accurate.   17: MOCA score for simliarity was 2/2. 17: For mental manipulation, opposites with a delay and pt was 80% accurate. 7/10/17: Similarities and Differences: 70% accurate.   -HG     Patient will improve verbal expressive language skills by completing divergent naming tasks 90%:;with cues  -HG     Status: Patient will improve verbal expressive language skills by completing divergent naming tasks Progressing as expected  -HG     Comments: Patient will improve verbal expressive language skills by completing divergent naming tasks 17: Homework for divergent naming and pt was 90% accurate. 17: Divergent namin17: For 10 item category naming, pt was 7/10 Independently. 17: For 10 item category naming, pt was 10/10 x 2 7/10/17: Homework for divergent concrete category naming and pt was 80% accurate.   -HG     Patient will improve verbal expressive language skills by listing words associated to target word provided 80%  -HG     Status: Patient will improve verbal expressive language skills by listing words associated to target word provided Progressing as expected  -HG     Comments: Patient will improve verbal expressive language skills by listing words associated to target word provided 17: MOCA fluency/naming score was 25%. 17: Pt  was 70% accurate this date for homework turned in. 7/24/17: Pt was 25-50% accurate this date with mod verbal cues. 7/14/17: Pt was 50% accurate Independently.   -HG     Patient will improve verbal expressive language skills by describing attributes, function, action and/or uses of an object/item 80%:;with cues  -HG     Status: Patient will improve verbal expressive language skills by describing attributes, function, action and/or uses of an object/item Progressing as expected  -HG     Comments: Patient will improve verbal expressive language skills by describing attributes, function, action and/or uses of an object/item 9/25/17: Describing with attributes and pt was 50% productive with no cues and with max cues and prompts, pt improved to 70% accuracy. 7/31/17: For definitions, pt was 70% accurate. 7/28/17: For object description: pt was 50% accurate independently.   -HG     Patient will improve verbal expressive language skills by completing convergent naming tasks 80%:;with cues  -HG     Status: Patient will improve verbal expressive language skills by completing convergent naming tasks Progressing as expected  -HG     Comments: Patient will improve verbal expressive language skills by completing convergent naming tasks 9/25/17: Within 5 mins: pt came up with 10 items for divergent category naming. 7/31/17: Divergent naming and pt was 7/10.  7/28/17: For 10 items, pt was 8/10. 7/17/17: For convergent category homework, pt was 70% accurate. 7/14/17: Convergent naming for concrete: pt was 80% accurate. 7/10/17: Convergent naming: pt was 50% accurate.   -HG     Memory Goals    Patient and family will implement compensatory strategies to maximize patient’s Memory function so patient can continue to participate in daily activities 90%:;with intermittent cues  -HG     Status: Patient and family will implement compensatory strategies to maximize patient’s Memory function so patient can continue to participate in daily  activities Progressing as expected  -HG     Comments: Patient and family will implement compensatory strategies to maximize patient’s Memory function so patient can continue to participate in daily activities 9/25/17: Pt made consistent journal entries since last therapy session.  9/18/17: Pt brought in journal and had 5 entries from the last time I had seen him. 9/11/17: Pt not seen for one month and claims to not be writing in his journal. 7/31/17: Pt returned to session with entries from over the weekend. 7/28/17: Provided pt a notebook and journal entry began this date and pt educated on examples for the journal. 7/17/17: Pt this date agreed that he needs to use a notepad and would use one- SLP to provide. 7/14/17: Pt stated that he doesn't want to use a notepad or notebook to write happenings down at this time. 7/10/17: Pt stated he used visualization at home when working on divergent naming task.   -HG     Patient will demonstrate improved ability to recall information by immediately recalling a series of words 80%:;related;with intermittent cues  -HG     Status: Patient will demonstrate improved ability to recall information by immediately recalling a series of words Progressing as expected  -HG     Comments: Patient will demonstrate improved ability to recall information by immediately recalling a series of words 9/11/17: Mental manipulation for word list retention: pt was 25% accurate.  7/28/17: Mental manipulation for 3 number varied ordering, pt was 80% accurate. 7/17/17: For 4 word mental manipulation reverse order, pt was 75% accurate.  7/14/17: 3 related word recall and pt was 3/3 x 2; 4 related words:  7/10/17: 3 word: 3/3x 6, 4 word: 3/3 x 6. 3 word reverse order: pt was 90% accurate, 4 word reversal: 70% accuracy.   -HG     Patient’s memory skills will be enhanced as reported by patient by utilizing internal memory strategies to recall up to 3 pieces of information after a 5- minute delay 80%:;with  intermittent cues  -HG     Status: Patient’s memory skills will be enhanced as reported by patient by utilizing internal memory strategies to recall up to 3 pieces of information after a 5- minute delay Progressing as expected  -HG     Comments: Patient’s memory skills will be enhanced as reported by patient by utilizing internal memory strategies to recall up to 3 pieces of information after a 5- minute delay 9/18/17: For 5 un-related words, pt was T1: 4/5, T2: 4/5, T3: 4/5, T4: 5/5. 9/11/17: For 5 un-related words, pt was 0/5 for the MOCA and after rehearsal and strategies, pt improved to 4/5. 7/31/17: For 5 un-related words, pt was 4/5 with no review of words from previous sessions on T1; T2: 4/5 ,T3: 5/5. 7/28/17: For 4 un-related word recall, pt was 4/4 x 2. 5 un-related words, T1: 3/5, T2: 4/5, T3: 5/5 with reps T4: 4/5.    7/24/17: Pt acheived 6/6 associated word recall with a delay  x 3. Varied list started at 4 words this date. 7/17/17: For 4 word recall, pt was 4/4 with no cues using linking and rehearsal. For 5 word recall, pt was 4/5 with written and linking strategy. 7/14/17: Progressed to 4 related word recall and pt was 3/4 x 2 with cues required. 7/10/17: Three related words: pt was 3/3 and used association for recall purposes  -HG     Status: Patient’s memory skills will be enhanced as reported by patient by using external memory aides Progressing as expected  -HG     Comments: Patient’s memory skills will be enhanced as reported by patient by using external memory aides 7/24/17: For 3 word order mental manipulation, pt was 100% accurate. For number reversal, for 3, pt was 100% accurate and for 4 numbers, pt was 80% accurate.  7/14/17: 4 word mental manipulation, reverse order and pt was 60% accurate. 7/10/17: For 3 and 4 word mental manipulation, grouping of items was used and pt's accuracy improved by 10-15%.   -HG     Other Goals    Other Adult Goal- 1 Pt will participate in one social activity a  "week in order to improve his functional language at conversation level as reported by the wife.  -HG     Status: Other Adult Goal- 1 Progressing as expected  -HG     Comments: Other Adult Goal- 1 9/25/17: Pt given assignment to call therapist daily and leave a specific message in order to improve language skills. 9/18/17: Pt is attending [employer] and feels pretty comfortable with conversation skills in that setting. 9/11/17: Pt stated, \"No, stagnant\" in response to if he has been going out and doing anything socially. 7/31/17: Pt did not go to [employer] for coffee with men and had no 7/28/17: Pt went to [employer] and attempted to be social, asked to sit with men he didn't know. 7/24/17: Pt stated that he did take a walk and met a man sitting on the sidewalk. 7/17/17: Pt stated he went to [employer] and none of the [employer] group was there. 7/14/17: Pt stated he had attended [employer] this past Sunday and that he and wife are still walking. Challenged to join the men's group at [employer] like he used to do.   -HG     Other Adult Goal- 2 Pt will improve RBANS score to at least the low average range across all assessments.   -HG     Status: Other Adult Goal- 2 Progressing as expected  -HG     Comments: Other Adult Goal- 2 9/11/17: MOCA score of 21/30 is outside of Normal Range and therefore reveals deficits consistent with previous RBANS assessment. 8/4/17: RBANS re-administered this date with overall score improving to a 351 from a 347.   -HG     SLP Time Calculation    SLP Goal Re-Cert Due Date 10/11/17  -HG       User Key  (r) = Recorded By, (t) = Taken By, (c) = Cosigned By    Initials Name Provider Type    HG Jennifer Arreola MS [facility]-SLP Speech and Language Pathologist                OP SLP Education       09/25/17 0800    Education    Education Comments Pt given homework for thought organization and reasoning.   -HG      User Key  (r) = Recorded By, (t) = Taken By, (c) = Cosigned By    Initials Name Effective Dates    " [place] Arreola MS CCC-SLP 06/22/15 -                 OP SLP Assessment/Plan - 09/25/17 0800     SLP Plan    Plan Comments Cont with Cog tx.   -      User Key  (r) = Recorded By, (t) = Taken By, (c) = Cosigned By    Initials Name Provider Type    [place] Arreola MS CCC-SLP Speech and Language Pathologist                 Time Calculation:   SLP Start Time: 0800    Therapy Charges for Today     Code Description Service Date Service Provider Modifiers Qty    [id number]  ST TREATMENT SPEECH 4 9/25/2017 Jennifer Arreola MS CCC-SLP GN 1                   Jennifer Arreola MS CCC-SLP  9/25/2017

## 2017-09-26 ENCOUNTER — APPOINTMENT (OUTPATIENT)
Dept: SPEECH THERAPY | Facility: HOSPITAL | Age: 67
End: 2017-09-26

## 2017-10-02 ENCOUNTER — HOSPITAL ENCOUNTER (OUTPATIENT)
Dept: SPEECH THERAPY | Facility: HOSPITAL | Age: 67
Setting detail: THERAPIES SERIES
Discharge: HOME | End: 2017-10-02

## 2017-10-02 DIAGNOSIS — G31.84 MILD COGNITIVE IMPAIRMENT: Primary | ICD-10-CM

## 2017-10-02 PROCEDURE — 92507 TX SP LANG VOICE COMM INDIV: CPT

## 2017-10-02 NOTE — THERAPY TREATMENT NOTE
Outpatient Speech Language Pathology   Adult Speech Language Cognitive Treatment Note  [facility]     Patient Name: Christo Prince  : 1950  MRN: [id number]  Today's Date: 10/2/2017         Visit Date: 10/02/2017   Patient Active Problem List   Diagnosis   • Benign essential hypertension   • Gastroesophageal reflux disease   • CAD in native artery   • Fahr's disease   • S/P CABG x 5   • Mild cognitive impairment   • Migraines          Visit Dx:    ICD-10-CM ICD-9-CM   1. Mild cognitive impairment G31.84 331.83                               SLP OP Goals       10/02/17 1000       Goal Type Needed    Goal Type Needed Verbal Expression;Memory;Other Adult Goals  -HG     Subjective Comments    Subjective Comments Pt alert, cooperative, pt brought in journal and has been   -HG     Verbal Expression Goals    Verbal Expression LTG's Patient will be able to use verbal expressive language skills to communicate effectively in all situations with unfamiliar listener  -HG     Patient will be able to use verbal expressive language skills to communicate effectively in all situations with unfamiliar listener 90%:;with intermittent cues  -HG     Status: Patient will be able to use verbal expressive language skills to communicate effectively in all situations with unfamiliar listener Progressing as expected  -HG     Comments: Patient will be able to use verbal expressive language skills to communicate effectively in all situations with unfamiliar listener 10/2/17: Pt given assignment to call SLP and leave messages given a specific topic and pt was 70% accurate in his response rate for this activity. 17: Pt went to a birthday party over the recent weekend and it was 'nice, had a good time' and pt felt he was able to engage in conversation 70-80% of the time. 17: More complex verbal sequencin% accurate with mod cues. 17: Verbal sequencin% for familiar topic. 17: Pt was asked to complete math problems  quickly in order to improve his response time and pt was 90% accurate. 17: Pt wsa 9/10 on trial 1 in 60 seconds of divergent naming of concrete items. T2: 7/10/17: Pt completed a divergent naming task for homework with 80% accuracy.   -HG     Patient will improve verbal expressive language skills by describing single/multiple similarities and differences between two target items 80%:;with cues  -HG     Status: Patient will improve verbal expressive language skills by describing single/multiple similarities and differences between two target items Progressing as expected  -HG     Comments: Patient will improve verbal expressive language skills by describing single/multiple similarities and differences between two target items 17: Similarities and Differences: 75% accurate.   17: MOCA score for simliarity was 2/2. 17: For mental manipulation, opposites with a delay and pt was 80% accurate. 7/10/17: Similarities and Differences: 70% accurate.   -HG     Patient will improve verbal expressive language skills by completing divergent naming tasks 90%:;with cues  -HG     Status: Patient will improve verbal expressive language skills by completing divergent naming tasks Progressing as expected  -HG     Comments: Patient will improve verbal expressive language skills by completing divergent naming tasks 17: Homework for divergent naming and pt was 90% accurate. 17: Divergent namin17: For 10 item category naming, pt was 7/10 Independently. 17: For 10 item category naming, pt was 10/10 x 2 7/10/17: Homework for divergent concrete category naming and pt was 80% accurate.   -HG     Patient will improve verbal expressive language skills by listing words associated to target word provided 80%  -HG     Status: Patient will improve verbal expressive language skills by listing words associated to target word provided Progressing as expected  -HG     Comments: Patient will improve verbal  expressive language skills by listing words associated to target word provided 9/11/17: MOCA fluency/naming score was 25%. 7/28/17: Pt was 70% accurate this date for homework turned in. 7/24/17: Pt was 25-50% accurate this date with mod verbal cues. 7/14/17: Pt was 50% accurate Independently.   -HG     Patient will improve verbal expressive language skills by describing attributes, function, action and/or uses of an object/item 80%:;with cues  -HG     Status: Patient will improve verbal expressive language skills by describing attributes, function, action and/or uses of an object/item Progressing as expected  -HG     Comments: Patient will improve verbal expressive language skills by describing attributes, function, action and/or uses of an object/item 10/2/17: For homework where pt had to leave messages on SLP's voicemail and pt was 50% with processing of information. 9/25/17: Describing with attributes and pt was 50% productive with no cues and with max cues and prompts, pt improved to 70% accuracy. 7/31/17: For definitions, pt was 70% accurate. 7/28/17: For object description: pt was 50% accurate independently.   -HG     Patient will improve verbal expressive language skills by completing convergent naming tasks 80%:;with cues  -HG     Status: Patient will improve verbal expressive language skills by completing convergent naming tasks Progressing as expected  -HG     Comments: Patient will improve verbal expressive language skills by completing convergent naming tasks 9/25/17: Within 5 mins: pt came up with 10 items for divergent category naming. 7/31/17: Divergent naming and pt was 7/10.  7/28/17: For 10 items, pt was 8/10. 7/17/17: For convergent category homework, pt was 70% accurate. 7/14/17: Convergent naming for concrete: pt was 80% accurate. 7/10/17: Convergent naming: pt was 50% accurate.   -HG     Memory Goals    Patient and family will implement compensatory strategies to maximize patient’s Memory  function so patient can continue to participate in daily activities 90%:;with intermittent cues  -HG     Status: Patient and family will implement compensatory strategies to maximize patient’s Memory function so patient can continue to participate in daily activities Progressing as expected  -HG     Comments: Patient and family will implement compensatory strategies to maximize patient’s Memory function so patient can continue to participate in daily activities 9/25/17: Pt made consistent journal entries since last therapy session.  9/18/17: Pt brought in journal and had 5 entries from the last time I had seen him. 9/11/17: Pt not seen for one month and claims to not be writing in his journal. 7/31/17: Pt returned to session with entries from over the weekend. 7/28/17: Provided pt a notebook and journal entry began this date and pt educated on examples for the journal. 7/17/17: Pt this date agreed that he needs to use a notepad and would use one- SLP to provide. 7/14/17: Pt stated that he doesn't want to use a notepad or notebook to write happenings down at this time. 7/10/17: Pt stated he used visualization at home when working on divergent naming task.   -HG     Patient will demonstrate improved ability to recall information by immediately recalling a series of words 80%:;related;with intermittent cues  -HG     Status: Patient will demonstrate improved ability to recall information by immediately recalling a series of words Progressing as expected  -HG     Comments: Patient will demonstrate improved ability to recall information by immediately recalling a series of words 10/2/17: For recall of a 36-50 word paragraph, pt was 90% accurate.   9/11/17: Mental manipulation for word list retention: pt was 25% accurate.  7/28/17: Mental manipulation for 3 number varied ordering, pt was 80% accurate. 7/17/17: For 4 word mental manipulation reverse order, pt was 75% accurate.  7/14/17: 3 related word recall and pt was 3/3  x 2; 4 related words:  7/10/17: 3 word: 3/3x 6, 4 word: 3/3 x 6. 3 word reverse order: pt was 90% accurate, 4 word reversal: 70% accuracy.   -HG     Patient’s memory skills will be enhanced as reported by patient by utilizing internal memory strategies to recall up to 3 pieces of information after a 5- minute delay 80%:;with intermittent cues  -HG     Status: Patient’s memory skills will be enhanced as reported by patient by utilizing internal memory strategies to recall up to 3 pieces of information after a 5- minute delay Progressing as expected  -HG     Comments: Patient’s memory skills will be enhanced as reported by patient by utilizing internal memory strategies to recall up to 3 pieces of information after a 5- minute delay 9/18/17: For 5 un-related words, pt was T1: 4/5, T2: 4/5, T3: 4/5, T4: 5/5. 9/11/17: For 5 un-related words, pt was 0/5 for the MOCA and after rehearsal and strategies, pt improved to 4/5. 7/31/17: For 5 un-related words, pt was 4/5 with no review of words from previous sessions on T1; T2: 4/5 ,T3: 5/5. 7/28/17: For 4 un-related word recall, pt was 4/4 x 2. 5 un-related words, T1: 3/5, T2: 4/5, T3: 5/5 with reps T4: 4/5.    7/24/17: Pt acheived 6/6 associated word recall with a delay  x 3. Varied list started at 4 words this date. 7/17/17: For 4 word recall, pt was 4/4 with no cues using linking and rehearsal. For 5 word recall, pt was 4/5 with written and linking strategy. 7/14/17: Progressed to 4 related word recall and pt was 3/4 x 2 with cues required. 7/10/17: Three related words: pt was 3/3 and used association for recall purposes  -HG     Status: Patient’s memory skills will be enhanced as reported by patient by using external memory aides Progressing as expected  -HG     Comments: Patient’s memory skills will be enhanced as reported by patient by using external memory aides 10/2/17: Pt did not use his journal over the last week and pt did not have a reason for not doing so but  "stated that he didn't know he needed to with all of the other homework he had. 7/24/17: For 3 word order mental manipulation, pt was 100% accurate. For number reversal, for 3, pt was 100% accurate and for 4 numbers, pt was 80% accurate.  7/14/17: 4 word mental manipulation, reverse order and pt was 60% accurate. 7/10/17: For 3 and 4 word mental manipulation, grouping of items was used and pt's accuracy improved by 10-15%.   -HG     Other Goals    Other Adult Goal- 1 Pt will participate in one social activity a week in order to improve his functional language at conversation level as reported by the wife.  -HG     Status: Other Adult Goal- 1 Progressing as expected  -HG     Comments: Other Adult Goal- 1 9/25/17: Pt given assignment to call therapist daily and leave a specific message in order to improve language skills. 9/18/17: Pt is attending [employer] and feels pretty comfortable with conversation skills in that setting. 9/11/17: Pt stated, \"No, stagnant\" in response to if he has been going out and doing anything socially. 7/31/17: Pt did not go to [employer] for coffee with men and had no 7/28/17: Pt went to [employer] and attempted to be social, asked to sit with men he didn't know. 7/24/17: Pt stated that he did take a walk and met a man sitting on the sidewalk. 7/17/17: Pt stated he went to [employer] and none of the [employer] group was there. 7/14/17: Pt stated he had attended [employer] this past Sunday and that he and wife are still walking. Challenged to join the [employer] group at [employer] like he used to do.   -HG     Other Adult Goal- 2 Pt will improve RBANS score to at least the low average range across all assessments.   -HG     Status: Other Adult Goal- 2 Progressing as expected  -HG     Comments: Other Adult Goal- 2 9/11/17: MOCA score of 21/30 is outside of Normal Range and therefore reveals deficits consistent with previous RBANS assessment. 8/4/17: RBANS re-administered this date with overall score improving " to a 351 from a 347.   -     SLP Time Calculation    SLP Goal Re-Cert Due Date 10/11/17  -       User Key  (r) = Recorded By, (t) = Taken By, (c) = Cosigned By    Initials Name Provider Type    LONDON Arreola MS CCC-SLP Speech and Language Pathologist                OP SLP Education       10/02/17 1000    Education    Education Comments Pt given homework for processing, thought organizaiton and recall.   -      User Key  (r) = Recorded By, (t) = Taken By, (c) = Cosigned By    Initials Name Effective Dates    LONDON Arreola MS CCC-SLP 06/22/15 -                 OP SLP Assessment/Plan - 10/02/17 1000     SLP Plan    Plan Comments Cont with Cog tx with focus on processing.   -      User Key  (r) = Recorded By, (t) = Taken By, (c) = Cosigned By    Initials Name Provider Type    LONDON Arreola MS CCC-SLP Speech and Language Pathologist                 Time Calculation:   SLP Start Time: 1000    Therapy Charges for Today     Code Description Service Date Service Provider Modifiers Qty    [id number]  ST TREATMENT SPEECH 4 10/2/2017 Jennifer Arreola MS CCC-SLP GN 1                   Jennifer Arreola MS CCC-SLP  10/2/2017

## 2017-10-09 ENCOUNTER — APPOINTMENT (OUTPATIENT)
Dept: SPEECH THERAPY | Facility: HOSPITAL | Age: 67
End: 2017-10-09

## 2017-10-16 ENCOUNTER — HOSPITAL ENCOUNTER (OUTPATIENT)
Dept: SPEECH THERAPY | Facility: HOSPITAL | Age: 67
Setting detail: THERAPIES SERIES
Discharge: HOME | End: 2017-10-16

## 2017-10-16 DIAGNOSIS — G31.84 MILD COGNITIVE IMPAIRMENT: Primary | ICD-10-CM

## 2017-10-16 PROCEDURE — G9168 MEMORY CURRENT STATUS: HCPCS

## 2017-10-16 PROCEDURE — G9169 MEMORY GOAL STATUS: HCPCS

## 2017-10-16 PROCEDURE — 92507 TX SP LANG VOICE COMM INDIV: CPT

## 2017-10-16 PROCEDURE — G9170 MEMORY D/C STATUS: HCPCS

## 2017-10-16 NOTE — THERAPY DISCHARGE NOTE
Outpatient Speech Language Pathology   Adult Speech Language Cognitive Progress Note/Discharge Summary  [facility]     Patient Name: Christo Prince  : 1950  MRN: [id number]  Today's Date: 10/16/2017         Visit Date: 10/16/2017   Patient Active Problem List   Diagnosis   • Benign essential hypertension   • Gastroesophageal reflux disease   • CAD in native artery   • Fahr's disease   • S/P CABG x 5   • Mild cognitive impairment   • Migraines          Visit Dx:    ICD-10-CM ICD-9-CM   1. Mild cognitive impairment G31.84 331.83                             SLP OP Goals       10/16/17 0900       Goal Type Needed    Goal Type Needed Verbal Expression;Memory;Other Adult Goals  -HG     Subjective Comments    Subjective Comments Pt alert, cooperative, feeling not confident about his voicemails.   -HG     Verbal Expression Goals    Verbal Expression LTG's Patient will be able to use verbal expressive language skills to communicate effectively in all situations with unfamiliar listener  -HG     Patient will be able to use verbal expressive language skills to communicate effectively in all situations with unfamiliar listener 90%:;with intermittent cues  -HG     Status: Patient will be able to use verbal expressive language skills to communicate effectively in all situations with unfamiliar listener Progressing as expected  -HG     Comments: Patient will be able to use verbal expressive language skills to communicate effectively in all situations with unfamiliar listener 10/16/17: Pt was consistent with leaving messages for SLP however pt's messages were long in nature and he struggled with content. 10/2/17: Pt given assignment to call SLP and leave messages given a specific topic and pt was 70% accurate in his response rate for this activity. 17: Pt went to a birthday party over the recent weekend and it was 'nice, had a good time' and pt felt he was able to engage in conversation 70-80% of the time. 17: More  complex verbal sequencin% accurate with mod cues. 17: Verbal sequencin% for familiar topic. 17: Pt was asked to complete math problems quickly in order to improve his response time and pt was 90% accurate. 17: Pt wsa 9/10 on trial 1 in 60 seconds of divergent naming of concrete items. T2: 7/10/17: Pt completed a divergent naming task for homework with 80% accuracy.   -HG     Patient will improve verbal expressive language skills by describing single/multiple similarities and differences between two target items 80%:;with cues  -HG     Status: Patient will improve verbal expressive language skills by describing single/multiple similarities and differences between two target items Progressing as expected  -HG     Comments: Patient will improve verbal expressive language skills by describing single/multiple similarities and differences between two target items 10/16/17: MOCA re-eval: Pt was 2/2. 17: Similarities and Differences: 75% accurate.   17: MOCA score for simliarity was 2/2. 17: For mental manipulation, opposites with a delay and pt was 80% accurate. 7/10/17: Similarities and Differences: 70% accurate.   -HG     Patient will improve verbal expressive language skills by completing divergent naming tasks 90%:;with cues  -HG     Status: Patient will improve verbal expressive language skills by completing divergent naming tasks Progressing as expected  -HG     Comments: Patient will improve verbal expressive language skills by completing divergent naming tasks 10/16/17: MOCA re-eval and pt was 3/11 when given one minute for naming. 17: Homework for divergent naming and pt was 90% accurate. 17: Divergent namin17: For 10 item category naming, pt was 7/10 Independently. 17: For 10 item category naming, pt was 10/10 x 2 7/10/17: Homework for divergent concrete category naming and pt was 80% accurate.   -HG     Patient will improve verbal expressive  language skills by listing words associated to target word provided 80%  -HG     Status: Patient will improve verbal expressive language skills by listing words associated to target word provided Progressing as expected  -HG     Comments: Patient will improve verbal expressive language skills by listing words associated to target word provided 9/11/17: MOCA fluency/naming score was 25%. 7/28/17: Pt was 70% accurate this date for homework turned in. 7/24/17: Pt was 25-50% accurate this date with mod verbal cues. 7/14/17: Pt was 50% accurate Independently.   -HG     Patient will improve verbal expressive language skills by describing attributes, function, action and/or uses of an object/item 80%:;with cues  -HG     Status: Patient will improve verbal expressive language skills by describing attributes, function, action and/or uses of an object/item Progressing as expected  -HG     Comments: Patient will improve verbal expressive language skills by describing attributes, function, action and/or uses of an object/item 10/16/17: Language skills for voice messages was ~70% accurate with length of message too long with too many pauses. 10/2/17: For homework where pt had to leave messages on SLP's voicemail and pt was 50% with processing of information. 9/25/17: Describing with attributes and pt was 50% productive with no cues and with max cues and prompts, pt improved to 70% accuracy. 7/31/17: For definitions, pt was 70% accurate. 7/28/17: For object description: pt was 50% accurate independently.   -HG     Patient will improve verbal expressive language skills by completing convergent naming tasks 80%:;with cues  -HG     Status: Patient will improve verbal expressive language skills by completing convergent naming tasks Progressing as expected  -HG     Comments: Patient will improve verbal expressive language skills by completing convergent naming tasks 9/25/17: Within 5 mins: pt came up with 10 items for divergent  category naming. 7/31/17: Divergent naming and pt was 7/10.  7/28/17: For 10 items, pt was 8/10. 7/17/17: For convergent category homework, pt was 70% accurate. 7/14/17: Convergent naming for concrete: pt was 80% accurate. 7/10/17: Convergent naming: pt was 50% accurate.   -HG     Memory Goals    Patient and family will implement compensatory strategies to maximize patient’s Memory function so patient can continue to participate in daily activities 90%:;with intermittent cues  -HG     Status: Patient and family will implement compensatory strategies to maximize patient’s Memory function so patient can continue to participate in daily activities Achieved  -HG     Comments: Patient and family will implement compensatory strategies to maximize patient’s Memory function so patient can continue to participate in daily activities 10/16/17: Pt returned with journal entries for every day since seeing SLP. 9/25/17: Pt made consistent journal entries since last therapy session.  9/18/17: Pt brought in journal and had 5 entries from the last time I had seen him. 9/11/17: Pt not seen for one month and claims to not be writing in his journal. 7/31/17: Pt returned to session with entries from over the weekend. 7/28/17: Provided pt a notebook and journal entry began this date and pt educated on examples for the journal. 7/17/17: Pt this date agreed that he needs to use a notepad and would use one- SLP to provide. 7/14/17: Pt stated that he doesn't want to use a notepad or notebook to write happenings down at this time. 7/10/17: Pt stated he used visualization at home when working on divergent naming task.   -HG     Patient will demonstrate improved ability to recall information by immediately recalling a series of words 80%:;related;with intermittent cues  -HG     Status: Patient will demonstrate improved ability to recall information by immediately recalling a series of words Progressing as expected  -HG     Comments: Patient  will demonstrate improved ability to recall information by immediately recalling a series of words 10/2/17: For recall of a 36-50 word paragraph, pt was 90% accurate.   9/11/17: Mental manipulation for word list retention: pt was 25% accurate.  7/28/17: Mental manipulation for 3 number varied ordering, pt was 80% accurate. 7/17/17: For 4 word mental manipulation reverse order, pt was 75% accurate.  7/14/17: 3 related word recall and pt was 3/3 x 2; 4 related words:  7/10/17: 3 word: 3/3x 6, 4 word: 3/3 x 6. 3 word reverse order: pt was 90% accurate, 4 word reversal: 70% accuracy.   -HG     Patient’s memory skills will be enhanced as reported by patient by utilizing internal memory strategies to recall up to 3 pieces of information after a 5- minute delay 80%:;with intermittent cues  -HG     Status: Patient’s memory skills will be enhanced as reported by patient by utilizing internal memory strategies to recall up to 3 pieces of information after a 5- minute delay Progressing as expected  -HG     Comments: Patient’s memory skills will be enhanced as reported by patient by utilizing internal memory strategies to recall up to 3 pieces of information after a 5- minute delay 10/16/17: MOCA re-eval: 3/5 with no review, after review: pt improved to 4/5. 9/18/17: For 5 un-related words, pt was T1: 4/5, T2: 4/5, T3: 4/5, T4: 5/5. 9/11/17: For 5 un-related words, pt was 0/5 for the MOCA and after rehearsal and strategies, pt improved to 4/5. 7/31/17: For 5 un-related words, pt was 4/5 with no review of words from previous sessions on T1; T2: 4/5 ,T3: 5/5. 7/28/17: For 4 un-related word recall, pt was 4/4 x 2. 5 un-related words, T1: 3/5, T2: 4/5, T3: 5/5 with reps T4: 4/5.    7/24/17: Pt acheived 6/6 associated word recall with a delay  x 3. Varied list started at 4 words this date. 7/17/17: For 4 word recall, pt was 4/4 with no cues using linking and rehearsal. For 5 word recall, pt was 4/5 with written and linking strategy.  "7/14/17: Progressed to 4 related word recall and pt was 3/4 x 2 with cues required. 7/10/17: Three related words: pt was 3/3 and used association for recall purposes  -HG     Status: Patient’s memory skills will be enhanced as reported by patient by using external memory aides Progressing as expected  -HG     Comments: Patient’s memory skills will be enhanced as reported by patient by using external memory aides 10/2/17: Pt did not use his journal over the last week and pt did not have a reason for not doing so but stated that he didn't know he needed to with all of the other homework he had. 7/24/17: For 3 word order mental manipulation, pt was 100% accurate. For number reversal, for 3, pt was 100% accurate and for 4 numbers, pt was 80% accurate.  7/14/17: 4 word mental manipulation, reverse order and pt was 60% accurate. 7/10/17: For 3 and 4 word mental manipulation, grouping of items was used and pt's accuracy improved by 10-15%.   -HG     Other Goals    Other Adult Goal- 1 Pt will participate in one social activity a week in order to improve his functional language at conversation level as reported by the wife.  -HG     Status: Other Adult Goal- 1 Progressing as expected  -HG     Comments: Other Adult Goal- 1 9/25/17: Pt given assignment to call therapist daily and leave a specific message in order to improve language skills. 9/18/17: Pt is attending [employer] and feels pretty comfortable with conversation skills in that setting. 9/11/17: Pt stated, \"No, stagnant\" in response to if he has been going out and doing anything socially. 7/31/17: Pt did not go to [employer] for coffee with men and had no 7/28/17: Pt went to [employer] and attempted to be social, asked to sit with men he didn't know. 7/24/17: Pt stated that he did take a walk and met a man sitting on the sidewalk. 7/17/17: Pt stated he went to [employer] and none of the men's group was there. 7/14/17: Pt stated he had attended [employer] this past Sunday and " that he and wife are still walking. Challenged to join the men's group at [facility] like he used to do.   -HG     Other Adult Goal- 2 Pt will improve RBANS score to at least the low average range across all assessments.   -HG     Status: Other Adult Goal- 2 Progressing as expected  -     Comments: Other Adult Goal- 2 10/16/17: MOCA score improved to 27/30. 9/11/17: MOCA score of 21/30 is outside of Normal Range and therefore reveals deficits consistent with previous RBANS assessment. 8/4/17: RBANS re-administered this date with overall score improving to a 351 from a 347.   -     SLP Time Calculation    SLP Goal Re-Cert Due Date 11/15/17  -       User Key  (r) = Recorded By, (t) = Taken By, (c) = Cosigned By    Initials Name Provider Type    LONDON Arreola MS CCC-SLP Speech and Language Pathologist                OP SLP Education       10/16/17 0900    Education    Barriers to Learning No barriers identified  -    Education Provided Described results of evaluation;Patient expressed understanding of evaluation;Patient requires further education on strategies, risks  -    Assessed Learning needs;Learning motivation;Learning preferences;Learning readiness  -    Learning Motivation Strong  -    Learning Method Explanation;Demonstration;Teach back;Written materials  -    Teaching Response Verbalized understanding;Demonstrated understanding  -    Education Comments Pt given home exercise program for cognitive tx until he returns at the beginning of the year  -      User Key  (r) = Recorded By, (t) = Taken By, (c) = Cosigned By    Initials Name Effective Dates     Jennifer Arreola MS CCC-SLP 06/22/15 -                 OP SLP Assessment/Plan - 10/16/17 0900     SLP Assessment    Functional Problems Speech Language- Adult/Cognition  -    Impact on Function: Adult Speech Language/Cognition Difficulty communicating in a medical emergency;Restrictions in personal and social life;Difficulty  sequencing thoughts to express complex messages;Difficulty participating in avocational activities;Poor attention to task  -HG    Clinical Impression: Speech Language-Adult/Congnition Mild-Moderate:;Cognitive Communication Impairment  -HG    Clinical Impression Comments MOCA re-assessment score was 27/30.  -HG    Please refer to paper survey for additional self-reported information Yes  -HG    Please refer to items scanned into chart for additional diagnostic informaiton and handouts as provided by clinician Yes  -HG    SLP Diagnosis Mild to Moderate Cognitive linguistic Impairment  -HG    Prognosis Excellent (comment)  -HG    Patient/caregiver participated in establishment of treatment plan and goals Yes  -HG    Patient would benefit from skilled therapy intervention Yes  -HG    SLP Plan    Frequency 1-2x/week  -HG    Duration 4 weeks  -HG    Planned CPT's? SLP INDIVIDUAL SPEECH THERAPY: 71687  -    Expected Duration Therapy Session (min) 45-60 minutes  -HG    Plan Comments D/C from skilled services at this time.   -      User Key  (r) = Recorded By, (t) = Taken By, (c) = Cosigned By    Initials Name Provider Type     Jennifer Arreola MS CCC-SLP Speech and Language Pathologist                   Time Calculation:   SLP Start Time: 0900    Therapy Charges for Today     Code Description Service Date Service Provider Modifiers Qty    92330303812 HC ST MEMORY CURRENT 10/16/2017 Jennifer Arreola MS CCC-SLP GN, CJ 1    38547747324 HC ST MEMORY PROJECTED 10/16/2017 Jennifer Arreola MS CCC-SLP GN, CJ 1    45230190202 HC ST MEMORY DISCHARGE 10/16/2017 Jennifer Arreola MS CCC-SLP GN, CJ 1    96949243842 HC ST TREATMENT SPEECH 4 10/16/2017 Jennifer Arreola MS CCC-SLP GN 1          SLP G-Codes  Functional Limitations: Memory  Memory Current Status (): At least 20 percent but less than 40 percent impaired, limited or restricted  Memory Goal Status (): At least 20 percent but less than 40 percent impaired,  limited or restricted  Memory Discharge Status (): At least 20 percent but less than 40 percent impaired, limited or restricted             Jennifer Arreola, MS CCC-SLP  10/16/2017

## 2017-10-23 ENCOUNTER — APPOINTMENT (OUTPATIENT)
Dept: SPEECH THERAPY | Facility: HOSPITAL | Age: 67
End: 2017-10-23

## 2017-10-30 ENCOUNTER — APPOINTMENT (OUTPATIENT)
Dept: SPEECH THERAPY | Facility: HOSPITAL | Age: 67
End: 2017-10-30

## 2018-08-03 RX ORDER — DONEPEZIL HYDROCHLORIDE 10 MG/1
10 TABLET, FILM COATED ORAL DAILY
Qty: 30 TABLET | Refills: 10 | Status: SHIPPED | OUTPATIENT
Start: 2018-08-03 | End: 2019-06-19 | Stop reason: SDUPTHER

## 2018-08-06 RX ORDER — MEMANTINE HYDROCHLORIDE 10 MG/1
10 TABLET ORAL
Qty: 60 TABLET | Refills: 0 | Status: SHIPPED | OUTPATIENT
Start: 2018-08-06 | End: 2018-09-15 | Stop reason: SDUPTHER

## 2018-08-20 ENCOUNTER — TELEPHONE (OUTPATIENT)
Dept: NEUROLOGY | Facility: CLINIC | Age: 68
End: 2018-08-20

## 2018-08-20 NOTE — TELEPHONE ENCOUNTER
Spoke with Kesha regarding Christo's refills. States Christo has been doing great but since he has not been seen in a year will call back and schedule a FUP.

## 2018-09-17 RX ORDER — MEMANTINE HYDROCHLORIDE 10 MG/1
TABLET ORAL
Qty: 60 TABLET | Refills: 5 | Status: SHIPPED | OUTPATIENT
Start: 2018-09-17 | End: 2019-04-22 | Stop reason: SDUPTHER

## 2018-09-17 NOTE — TELEPHONE ENCOUNTER
Wife called back. Pt scheduled on 10/11/2018 @ 10:30am. Will refill his Namenda. Good on Donepezil.

## 2018-10-11 ENCOUNTER — OFFICE VISIT (OUTPATIENT)
Dept: NEUROLOGY | Facility: CLINIC | Age: 68
End: 2018-10-11

## 2018-10-11 VITALS
SYSTOLIC BLOOD PRESSURE: 120 MMHG | DIASTOLIC BLOOD PRESSURE: 74 MMHG | WEIGHT: 207 LBS | HEIGHT: 71 IN | BODY MASS INDEX: 28.98 KG/M2

## 2018-10-11 DIAGNOSIS — G31.84 MCI (MILD COGNITIVE IMPAIRMENT): Primary | ICD-10-CM

## 2018-10-11 PROCEDURE — 99214 OFFICE O/P EST MOD 30 MIN: CPT | Performed by: PHYSICIAN ASSISTANT

## 2018-10-11 NOTE — PROGRESS NOTES
"Subjective     Chief Complaint: memory loss, word finding difficulty      History of Present Illness   Christo Prince is a 68 y.o. male who Christo Prince is a 67 y.o. male who returns to clinic today for evaluation of cognitive impairment. He has noted symptoms since July, 2016 after CABG at [facility] marked primarily by word-finding difficulties . His comprehension seems intact, and he is not making paraphasic errors. This has possible worsening over time. Additional associated symptoms have included impairments in executive function. His symptoms are worsened when under stress. He is currently residing at home with his wife in [place].      Prior evaluation has included screening blood work  which was unremarkable . 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 He is currently taking donepezil 5mg daily. He is currently participating in cognitive rehabilitation.    Today: Since his last visit in 7/17, he feels essentially unchanged cognitively and his family agrees. He continues to note word finding difficulties primarily.        I have reviewed and confirmed the past family, social and medical history as accurate on 10/11/18.     Review of Systems   Constitutional: Negative.    HENT: Negative.    Eyes: Negative.    Respiratory: Negative.    Cardiovascular: Negative.    Gastrointestinal: Negative.    Endocrine: Negative.    Genitourinary: Negative.    Musculoskeletal: Negative.    Skin: Negative.    Allergic/Immunologic: Negative.    Neurological:        Word finding difficulties    Hematological: Negative.    Psychiatric/Behavioral: Negative.        Objective     /74   Ht 180.3 cm (71\")   Wt 93.9 kg (207 lb)   BMI 28.87 kg/m²     General appearance today is normal.       Physical Exam   Constitutional: He is oriented to person, place, and time.   Neurological: He is oriented to person, place, and time. He has normal strength. He has a normal Finger-Nose-Finger " Test. Gait normal.   Psychiatric: His speech is normal.        Neurologic Exam     Mental Status   Oriented to person, place, and time.   Registration: recalls 3 of 3 objects. Recall at 5 minutes: recalls 3 of 3 objects. Follows 3 step commands.   Attention: 2/5 sequencing.   Speech: speech is normal   Able to name object. Able to read. Able to repeat. Able to write. Normal comprehension.     Cranial Nerves   Cranial nerves II through XII intact.     Motor Exam   Muscle bulk: normal  Overall muscle tone: normal    Strength   Strength 5/5 throughout.     Sensory Exam   Light touch normal.     Gait, Coordination, and Reflexes     Gait  Gait: normal    Coordination   Finger to nose coordination: normal    Tremor   Resting tremor: absent        Results  MMSE=26 (29 in 7/17)      Assessment/Plan   Christo was seen today for memory loss.    Diagnoses and all orders for this visit:    MCI (mild cognitive impairment)  -     Ambulatory Referral to Speech Therapy          Discussion/Summary   Christo Prince returns to clinic today for evaluation of cognitive impairment. His history and examination, including bedside cognitive testing are most consistent with Mild Cognitive Impairment (MCI) , which was discussed. This may be due to prior stroke, and VCI, though we will need to follow this to make sure this does not progress into another disorder. I again reviewed his current status and treatment options. After discussing potential treatment options, it was elected to continue on  donepezil and memantine unchanged. I have also made a referral to SLP for cognitive rehabilitation, which he will participate in locally. He will then follow up in 6 months , or sooner if needed.   I spent 25 minutes minutes face to face with the patient and family with 20 minutes spent on discussing diagnosis, prognosis, diagnostic testing, evaluation, current status, treatment options and management as discussed above.       As part of this visit I  obtained additional history from the family which is incorporated in the HPI.      Marissa Hagan PA-C

## 2019-04-17 ENCOUNTER — OFFICE VISIT (OUTPATIENT)
Dept: NEUROLOGY | Facility: CLINIC | Age: 69
End: 2019-04-17

## 2019-04-17 VITALS — HEIGHT: 71 IN | WEIGHT: 205 LBS | BODY MASS INDEX: 28.7 KG/M2

## 2019-04-17 DIAGNOSIS — R26.89 ABNORMALITY OF GAIT DUE TO IMPAIRMENT OF BALANCE: ICD-10-CM

## 2019-04-17 DIAGNOSIS — G31.84 MCI (MILD COGNITIVE IMPAIRMENT): Primary | ICD-10-CM

## 2019-04-17 PROCEDURE — 99214 OFFICE O/P EST MOD 30 MIN: CPT | Performed by: PSYCHIATRY & NEUROLOGY

## 2019-04-17 NOTE — PROGRESS NOTES
"Subjective     Chief Complaint: memory loss, word finding difficulty      History of Present Illness   Christo Prince is a 68 y.o. male who Christo Prince is a 67 y.o. male who returns to clinic today for evaluation of cognitive impairment. He has noted symptoms since July, 2016 after CABG at [facility] marked primarily by word-finding difficulties . His comprehension seems intact, and he is not making paraphasic errors. This has possible worsening over time. Additional associated symptoms have included impairments in executive function. His symptoms are worsened when under stress. He is currently residing at home with his wife in [place].      Prior evaluation has included screening blood work  which was unremarkable . 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     Since his last visit on 10/11/18, he again feels essentially unchanged. His wife confirms that he is doing well in terms of memory, but does not continued word-finding difficulty. She also feels that his balance has worsened. He remains on donepezil and memantine.      I have reviewed and confirmed the past family, social and medical history as accurate on 4/17/19.     Review of Systems   Constitutional: Negative.        Objective     Ht 180.3 cm (71\")   Wt 93 kg (205 lb)   BMI 28.59 kg/m²     General appearance today is normal.       Physical Exam   Constitutional: He is oriented to person, place, and time.   Neurological: He is oriented to person, place, and time. He has normal strength. He has a normal Finger-Nose-Finger Test. Gait normal.   Psychiatric: His speech is normal.        Neurologic Exam     Mental Status   Oriented to person, place, and time.   Registration: recalls 3 of 3 objects. Recall at 5 minutes: recalls 3 of 3 objects. Follows 3 step commands.   Attention: 2/5 sequencing.   Speech: speech is normal   Level of consciousness: alert  Able to name object. Able to read. Able to repeat. Able " to write. Normal comprehension.     Cranial Nerves   Cranial nerves II through XII intact.     Motor Exam   Muscle bulk: normal  Overall muscle tone: normal    Strength   Strength 5/5 throughout.     Sensory Exam   Light touch normal.     Gait, Coordination, and Reflexes     Gait  Gait: normal    Coordination   Finger to nose coordination: normal    Tremor   Resting tremor: absentNormal pull test         Results  MMSE=30      Assessment/Plan   Christo was seen today for memory loss.    Diagnoses and all orders for this visit:    MCI (mild cognitive impairment)  -     Ambulatory Referral to Physical Therapy Evaluate and treat  -     Ambulatory Referral to Occupational Therapy    Abnormality of gait due to impairment of balance  -     Ambulatory Referral to Physical Therapy Evaluate and treat  -     Ambulatory Referral to Occupational Therapy          Discussion/Summary   Christo Prince returns to clinic today with a history of suspected MCI (or VCI). His history and examination has been most consistent with Mild Cognitive Impairment (MCI), which was discussed. This may be due to prior stroke, and VCI, though we will need to follow this to make sure this does not progress into another disorder. I again reviewed his current status and treatment options. After discussing potential treatment options, it was elected to continue on  donepezil and memantine unchanged. Regarding his history of balance impairment, I don't find evidence for parkinsonism at this time, but raised concerns about impulsiveness secondary to VCI or an age related gait disorder. He would like to pursue PT/OT locally for this. He will then follow up in 6 months , or sooner if needed.     I spent 25 minutes face to face with the patient and family. I spent 15 minutes counseling and discussing diagnosis, current status, treatment options and management.    As part of this visit I obtained additional history from the family which is incorporated in the  HPI.      Shabbir Su MD

## 2019-04-22 RX ORDER — MEMANTINE HYDROCHLORIDE 10 MG/1
TABLET ORAL
Qty: 60 TABLET | Refills: 5 | Status: SHIPPED | OUTPATIENT
Start: 2019-04-22 | End: 2019-12-11 | Stop reason: SDUPTHER

## 2019-06-19 RX ORDER — DONEPEZIL HYDROCHLORIDE 10 MG/1
TABLET, FILM COATED ORAL
Qty: 30 TABLET | Refills: 10 | Status: SHIPPED | OUTPATIENT
Start: 2019-06-19 | End: 2020-06-08

## 2019-09-11 ENCOUNTER — TELEPHONE (OUTPATIENT)
Dept: NEUROLOGY | Facility: CLINIC | Age: 69
End: 2019-09-11

## 2019-09-11 NOTE — TELEPHONE ENCOUNTER
----- Message from Shubham Greg sent at 9/11/2019 11:31 AM EDT -----  Contact: [phone]  Dr. Griffin,    Pt's wife, Kesha, called in regards to Pt's referrals for Occupational Therapy, and Physical Therapy. Kesha states these appointments had been forgotten about , until they got a reminder call from [facility] in [place]. Kesha states they may need updated referrals, but she's not sure. Please advise.

## 2019-09-12 ENCOUNTER — TELEPHONE (OUTPATIENT)
Dept: NEUROLOGY | Facility: CLINIC | Age: 69
End: 2019-09-12

## 2019-09-12 NOTE — TELEPHONE ENCOUNTER
----- Message from Shubham Greg sent at 9/11/2019 11:31 AM EDT -----  Contact: [phone]  Dr. Griffin,    Pt's wife, Kesha, called in regards to Pt's referrals for Occupational Therapy, and Physical Therapy. Kesha states these appointments had been forgotten about , until they got a reminder call from [facility] in [place]. Kesha states they may need updated referrals, but she's not sure. Please advise.

## 2019-09-12 NOTE — TELEPHONE ENCOUNTER
Spoke with pt wife Kesha regarding questions concerning patient referrals for Occupational and Physical Therapy with [facility] asking if they need updated referrals. Informed Kesha that referral has already been sent and if more appt are needed they would contact insurance for approval. Pt wife Kesha stated understanding. Thanks.

## 2019-10-17 ENCOUNTER — OFFICE VISIT (OUTPATIENT)
Dept: NEUROLOGY | Facility: CLINIC | Age: 69
End: 2019-10-17

## 2019-10-17 VITALS — HEIGHT: 71 IN | BODY MASS INDEX: 28.7 KG/M2 | WEIGHT: 205 LBS

## 2019-10-17 DIAGNOSIS — G20 PARKINSONISM, UNSPECIFIED PARKINSONISM TYPE (HCC): ICD-10-CM

## 2019-10-17 DIAGNOSIS — G31.84 MCI (MILD COGNITIVE IMPAIRMENT): Primary | ICD-10-CM

## 2019-10-17 PROCEDURE — 99214 OFFICE O/P EST MOD 30 MIN: CPT | Performed by: PHYSICIAN ASSISTANT

## 2019-10-17 NOTE — PROGRESS NOTES
"Subjective     Chief Complaint: memory loss, word finding difficulty        History of Present Illness   Christo Prince is a 69 y.o. male who returns to clinic today for evaluation of cognitive impairment. He has noted symptoms since July, 2016 after CABG at [facility] marked primarily by word-finding difficulties . His comprehension seems intact, and he is not making paraphasic errors. This has possible worsening over time. Additional associated symptoms have included impairments in executive function. His symptoms are worsened when under stress. He is currently residing at home with his wife in [place].      Prior evaluation has included screening blood work  which was unremarkable . An MRI was notable for multiple punctate bilateral infarcts including one left frontally    Today: Since his last visit in 4/19, he feels essentially unchanged cognitively and his family agrees. He notes that his balance impairment has worsened. He reports a shuffling gait, associated rigidity and bradykinesia.        I have reviewed and confirmed the past family, social and medical history as accurate on 10/17/19.     Review of Systems   Constitutional: Negative.    HENT: Negative.    Eyes: Negative.    Respiratory: Negative.    Cardiovascular: Negative.    Gastrointestinal: Negative.    Endocrine: Negative.    Genitourinary: Negative.    Musculoskeletal: Negative.    Skin: Negative.    Allergic/Immunologic: Negative.    Hematological: Negative.    Psychiatric/Behavioral: Negative.        Objective     Ht 180.3 cm (71\")   Wt 93 kg (205 lb)   BMI 28.59 kg/m²     General appearance today is normal.     Physical Exam   Constitutional: He is oriented to person, place, and time.   Neurological: He is oriented to person, place, and time. He has normal strength. He has a normal Finger-Nose-Finger Test.   Psychiatric: His speech is normal.        Neurologic Exam     Mental Status   Oriented to person, place, and time.   Registration: " recalls 3 of 3 objects. Recall at 5 minutes: recalls 2 of 3 objects. Follows 3 step commands.   Attention: normal (4/5 sequencing).   Speech: speech is normal   Level of consciousness: alert  Able to name object. Able to read. Able to repeat. Able to write. Normal comprehension.     Cranial Nerves   Cranial nerves II through XII intact.     Motor Exam   Muscle bulk: normal  Overall muscle tone: normal    Strength   Strength 5/5 throughout.     Sensory Exam   Light touch normal.     Gait, Coordination, and Reflexes     Gait  Gait: (unsteady, mild shuffling)    Coordination   Finger to nose coordination: normal    Tremor   Resting tremor: present (in right hand )        Results  MMSE=28      Assessment/Plan   Christo was seen today for memory loss.    Diagnoses and all orders for this visit:    MCI (mild cognitive impairment)  -     Ambulatory Referral to Occupational Therapy  -     Ambulatory Referral to Speech Therapy    Parkinsonism, unspecified Parkinsonism type (CMS/HCC)  -     Ambulatory Referral to Physical Therapy Evaluate and treat (balance impairment )  -     Ambulatory Referral to Occupational Therapy          Discussion/Summary   Christo Prince returns to clinic today for evaluation of Mild Cognitive Impairment (MCI) and a possible associated parkinsonism. This was discussed in detail with the patient and his family. I again reviewed his current status and treatment options. After discussing potential treatment options, it was elected to continue on  donepezil and memantine unchanged. I have also made a referral to PT, OT, and SLP. He will then follow up in 6 months, or sooner if needed.   I spent 25 minutes face to face with the patient and family with 20 minutes spent on discussing diagnosis, prognosis, evaluation, current status, treatment options and management as discussed above.       As part of this visit I obtained additional history from the family which is incorporated in the HPI.      Marissa Hagan,  CHRISTI

## 2019-11-13 ENCOUNTER — TELEPHONE (OUTPATIENT)
Dept: NEUROLOGY | Facility: CLINIC | Age: 69
End: 2019-11-13

## 2019-11-15 ENCOUNTER — TELEPHONE (OUTPATIENT)
Dept: NEUROLOGY | Facility: CLINIC | Age: 69
End: 2019-11-15

## 2019-11-15 NOTE — TELEPHONE ENCOUNTER
----- Message from Shubham Garza sent at 11/15/2019 10:58 AM EST -----  Contact: [phone]  Jessica Hagan, with [facility] in [place] , left a vm stating pt's Physical Therapist believes pt would benefit from Out Patient Speech Therapy, and asked if an order can get  Faxed today, so they can see him this afternoon. Please advise.     Fax- [phone]

## 2019-12-11 RX ORDER — MEMANTINE HYDROCHLORIDE 10 MG/1
TABLET ORAL
Qty: 180 TABLET | Refills: 1 | Status: SHIPPED | OUTPATIENT
Start: 2019-12-11 | End: 2020-06-08

## 2020-06-08 RX ORDER — MEMANTINE HYDROCHLORIDE 10 MG/1
TABLET ORAL
Qty: 180 TABLET | Refills: 1 | Status: SHIPPED | OUTPATIENT
Start: 2020-06-08 | End: 2020-12-15

## 2020-06-08 RX ORDER — DONEPEZIL HYDROCHLORIDE 10 MG/1
TABLET, FILM COATED ORAL
Qty: 90 TABLET | Refills: 3 | Status: SHIPPED | OUTPATIENT
Start: 2020-06-08 | End: 2021-06-01

## 2020-10-14 ENCOUNTER — OFFICE VISIT (OUTPATIENT)
Dept: NEUROLOGY | Facility: CLINIC | Age: 70
End: 2020-10-14

## 2020-10-14 VITALS
HEART RATE: 66 BPM | RESPIRATION RATE: 16 BRPM | DIASTOLIC BLOOD PRESSURE: 70 MMHG | TEMPERATURE: 98 F | SYSTOLIC BLOOD PRESSURE: 102 MMHG

## 2020-10-14 DIAGNOSIS — G31.84 MCI (MILD COGNITIVE IMPAIRMENT): Primary | ICD-10-CM

## 2020-10-14 PROCEDURE — 99214 OFFICE O/P EST MOD 30 MIN: CPT | Performed by: PSYCHIATRY & NEUROLOGY

## 2020-10-14 RX ORDER — ESCITALOPRAM OXALATE 10 MG/1
10 TABLET ORAL DAILY
Qty: 30 TABLET | Refills: 2 | Status: SHIPPED | OUTPATIENT
Start: 2020-10-14 | End: 2022-01-05

## 2020-10-14 NOTE — PROGRESS NOTES
Subjective     Chief Complaint: memory loss, word finding difficulty        History of Present Illness   Christo Prince is a 70 y.o. male who returns to clinic today with a history of suspected MCI (or VCI). He has noted symptoms since July, 2016 after CABG at [facility] marked primarily by anomia in the absence of impaired comprehension or paraphasias.This has possibly worsened over time. Additional associated symptoms have included impairments in executive function. His symptoms are worsened when under stress. He is currently residing at home with his wife in [place].     He has previously reported a shuffling gait, rigidity and bradykinesia, though this seems to have resolved.     Prior evaluation has included screening blood work which was unremarkable . An MRI was notable for multiple punctate bilateral infarcts including one frontally on the left.    Since his last visit on 10/17/19 he reports feeling well and unchanged. His wife also agrees that he is cognitively unchanged, but is concerned by depression, noting apathy and/or anhedonia. She also notes incontinence, and has followed with urology previously.    I have reviewed and confirmed the past family, social and medical history as accurate on 10/14/20.     Review of Systems   Constitutional: Negative.    Respiratory: Negative.    Cardiovascular: Negative.    Gastrointestinal: Negative.    Genitourinary: Negative.        Objective     /70   Pulse 66   Temp 98 °F (36.7 °C)   Resp 16     General appearance today is normal.     Physical Exam   Constitutional: He is oriented to person, place, and time.   Neurological: He is oriented to person, place, and time. He has normal strength. He has a normal Finger-Nose-Finger Test. Gait normal.   Psychiatric: His speech is normal.        Neurologic Exam     Mental Status   Oriented to person, place, and time.   Registration: recalls 3 of 3 objects. Recall at 5 minutes: recalls 3 of 3 objects. Follows 3 step  commands.   Attention: normal.   Speech: speech is normal   Level of consciousness: alert  Able to name object. Able to read. Able to repeat. Able to write. Normal comprehension.     Cranial Nerves   Cranial nerves II through XII intact.     Motor Exam   Muscle bulk: normal  Overall muscle tone: normal    Strength   Strength 5/5 throughout.     Gait, Coordination, and Reflexes     Gait  Gait: normal    Coordination   Finger to nose coordination: normal        Results  MMSE=30      Assessment/Plan   Diagnoses and all orders for this visit:    1. MCI (mild cognitive impairment) (Primary)    Other orders  -     escitalopram (Lexapro) 10 MG tablet; Take 1 tablet by mouth Daily.  Dispense: 30 tablet; Refill: 2          Discussion/Summary   Christo Prince returns to clinic today with a history of suspected MCI/VCI and  possible parkinsonism. His neurological examination is reassuring, though I am concerned about depression as raised by his wife. I offered to repeat a CT scan, which was very reasonably declined. It was elected, however, to begin an SSRI. He will follow-up with urology regarding incontinence, and he will continue his other medications unchanged. He will then follow up in 6 months, or sooner if needed.     As part of this visit I obtained additional history from the family which is incorporated in the HPI.      Shabbir Su MD

## 2020-11-12 ENCOUNTER — TELEPHONE (OUTPATIENT)
Dept: NEUROLOGY | Facility: CLINIC | Age: 70
End: 2020-11-12

## 2020-11-12 NOTE — TELEPHONE ENCOUNTER
WE RECEIVED A NEW REFERRAL FOR THIS PT FOR SEIZURE DISORDER. PT CURRENTLY SEES [facility] FOR COGNITIVE CONCERNS. LAST SAW [facility] ON 10/17/19 AND HAS F/U APPT ON 04/15/21.    NEW RECORDS UPLOADED TO CHART AS EXTERNAL MEDICAL RECORDS FROM [facility].    PLEASE ADVISE FOR SCHEDULING.

## 2020-12-15 RX ORDER — MEMANTINE HYDROCHLORIDE 10 MG/1
TABLET ORAL
Qty: 180 TABLET | Refills: 1 | Status: SHIPPED | OUTPATIENT
Start: 2020-12-15 | End: 2021-06-15 | Stop reason: SDUPTHER

## 2021-06-01 RX ORDER — DONEPEZIL HYDROCHLORIDE 10 MG/1
TABLET, FILM COATED ORAL
Qty: 90 TABLET | Refills: 3 | Status: SHIPPED | OUTPATIENT
Start: 2021-06-01 | End: 2022-01-05

## 2021-06-15 RX ORDER — MEMANTINE HYDROCHLORIDE 10 MG/1
10 TABLET ORAL 2 TIMES DAILY
Qty: 180 TABLET | Refills: 1 | Status: SHIPPED | OUTPATIENT
Start: 2021-06-15 | End: 2022-01-05

## 2022-01-05 ENCOUNTER — OFFICE VISIT (OUTPATIENT)
Dept: NEUROLOGY | Facility: CLINIC | Age: 72
End: 2022-01-05

## 2022-01-05 VITALS
HEART RATE: 70 BPM | HEIGHT: 72 IN | DIASTOLIC BLOOD PRESSURE: 68 MMHG | OXYGEN SATURATION: 99 % | SYSTOLIC BLOOD PRESSURE: 122 MMHG | WEIGHT: 213 LBS | BODY MASS INDEX: 28.85 KG/M2

## 2022-01-05 DIAGNOSIS — G31.84 MCI (MILD COGNITIVE IMPAIRMENT): Primary | ICD-10-CM

## 2022-01-05 DIAGNOSIS — G20 PARKINSONISM, UNSPECIFIED PARKINSONISM TYPE: ICD-10-CM

## 2022-01-05 DIAGNOSIS — Z86.73 HISTORY OF CVA (CEREBROVASCULAR ACCIDENT): ICD-10-CM

## 2022-01-05 DIAGNOSIS — G23.8 FAHR'S DISEASE: ICD-10-CM

## 2022-01-05 PROBLEM — G20.C PARKINSONISM: Status: ACTIVE | Noted: 2022-01-05

## 2022-01-05 PROCEDURE — 99214 OFFICE O/P EST MOD 30 MIN: CPT | Performed by: NURSE PRACTITIONER

## 2022-01-05 RX ORDER — DONEPEZIL HYDROCHLORIDE 10 MG/1
10 TABLET, FILM COATED ORAL DAILY
Qty: 90 TABLET | Refills: 3 | Status: SHIPPED | OUTPATIENT
Start: 2022-01-05 | End: 2022-06-29 | Stop reason: SDUPTHER

## 2022-01-05 NOTE — PROGRESS NOTES
"Subjective:     Patient ID: Christo Prince is a 71 y.o. male.    CC:   Chief Complaint   Patient presents with   • mild cognitive impairment       HPI:   History of Present Illness   Christo Prince is a 71 y.o. male who returns to clinic today with suspected MCI (or VCI). He has noted symptoms since July, 2016 after CABG at [facility] marked primarily by anomia in the absence of impaired comprehension or paraphasias.This has possibly worsened over time. Additional associated symptoms have included impairments in executive function. His symptoms are worsened when under stress. He is currently residing at home with his wife in [place].      Prior evaluation has included screening blood work which was unremarkable . An MRI was notable for multiple punctate bilateral infarcts including one frontally on the left in 2016 following CABG.     Today 1/5/2022-he is accompanied by his wife during today's visit.  Last seen in clinic on 10/14/2020 by Dr. Su.  He has been following for mild cognitive impairment suspected.  Also differential of vascular cognitive impairment and possible parkinsonism.  He has been taking donepezil 10 mg daily.  He stopped the memantine about a year ago and his wife tells me they have really not seen any change with or without the medication.  Lexapro was started last year and she saw no improvement in his depression so this was also discontinued.  She tells me that she seen just a very slow decline overall.  He is not able to take his medications or pay bills.  She tells me that he is very slow with movement.  No anosmia.  Has had occasional dysphagia but passed a swallowing eval.  Has had no tremors but she notes he has repetitive movements in his hands and \"fidgets\".  He has had difficulty with balance.  No recent falls.  Currently going to physical therapy for balance issues.  Has been incontinent for years and has seen urology but this has worsened recently.  She has noticed decreased " facial expressions.  Noticed some shuffling and gait and very slow movements.  She tells me he will come to the table to eat dinner and have to be told to sit down.  He has a lot of word finding difficulties.  He also has Fahr disease familial cerebral Ferrocalcinosis history.  He has not tried any medications for parkinsonism.    The following portions of the patient's history were reviewed and updated as appropriate: allergies, current medications, past family history, past medical history, past social history, past surgical history and problem list.    Past Medical History:   Diagnosis Date   • Coronary artery disease    • Migraines        Past Surgical History:   Procedure Laterality Date   • CORONARY ARTERY BYPASS GRAFT N/A 7/29/2016    Procedure: INTRAOPERATIVE FRANCES, MIDLINE STERNOTOMY WITH CORONARY ARTERY BYPASS GRAPHS X 5 UTILIZING BILTERAL ANGELA'S AND LEFT ENDOSCOPIC HARVESTED SAPHENOUS VEIN, AND RIGHT OPEN HARVESTED SAPHENOUS VEIN;  Surgeon: De Montgomery MD;  Location: [facility];  Service:    • KNEE SURGERY Right        Social History     Socioeconomic History   • Marital status:    Tobacco Use   • Smoking status: Never Smoker   • Smokeless tobacco: Never Used   Vaping Use   • Vaping Use: Never used   Substance and Sexual Activity   • Alcohol use: No   • Drug use: No   • Sexual activity: Not Currently       Family History   Problem Relation Age of Onset   • Alzheimer's disease Mother    • Heart disease Father         Review of Systems   Constitutional: Negative for chills, fatigue, fever and unexpected weight change.   HENT: Negative for ear pain, hearing loss, nosebleeds, rhinorrhea and sore throat.    Eyes: Negative for photophobia, pain, discharge, itching and visual disturbance.   Respiratory: Negative for cough, chest tightness, shortness of breath and wheezing.    Cardiovascular: Negative for chest pain, palpitations and leg swelling.   Gastrointestinal: Negative for abdominal pain,  "blood in stool, constipation, diarrhea, nausea and vomiting.   Genitourinary: Negative for dysuria, frequency, hematuria and urgency.   Musculoskeletal: Positive for gait problem. Negative for arthralgias, back pain, joint swelling, myalgias, neck pain and neck stiffness.   Skin: Negative for rash and wound.   Allergic/Immunologic: Negative for environmental allergies and food allergies.   Neurological: Negative for dizziness, tremors, seizures, syncope, speech difficulty, weakness, light-headedness, numbness and headaches.   Hematological: Negative for adenopathy. Does not bruise/bleed easily.   Psychiatric/Behavioral: Positive for decreased concentration. Negative for agitation, confusion, hallucinations, sleep disturbance and suicidal ideas. The patient is not nervous/anxious.    All other systems reviewed and are negative.       Objective:  /68   Pulse 70   Ht 182.9 cm (72\")   Wt 96.6 kg (213 lb)   SpO2 99%   BMI 28.89 kg/m²     Neurologic Exam     Mental Status   Oriented to person, place, and time.   Speech: speech is normal (Some word finding difficulties notes)  Level of consciousness: alert    Cranial Nerves   Cranial nerves II through XII intact.     Motor Exam   Muscle bulk: normal  Right arm tone: cogwheel rigidity  Left arm tone: normal  Right leg tone: increased  Left leg tone: normal    Strength   Strength 5/5 throughout.     Gait, Coordination, and Reflexes     Gait  Gait: shuffling (mild shuffling, especially with turns, arm swing decreased)    Coordination   Finger to nose coordination: normal    Tremor   Resting tremor: absent  Intention tremor: absent  Action tremor: absent    Reflexes   Right brachioradialis: 2+  Left brachioradialis: 2+  Right biceps: 2+  Left biceps: 2+  Right patellar: 2+  Left patellar: 2+  Right achilles: 2+  Left achilles: 2+  Right : 2+  Left : 2+  Decreased finger and heel taps on right side marked, rigidity noted       Physical Exam  Constitutional:  "      Appearance: Normal appearance.   Neurological:      Mental Status: He is alert and oriented to person, place, and time.      Coordination: Finger-Nose-Finger Test normal.      Deep Tendon Reflexes: Strength normal.      Reflex Scores:       Bicep reflexes are 2+ on the right side and 2+ on the left side.       Brachioradialis reflexes are 2+ on the right side and 2+ on the left side.       Patellar reflexes are 2+ on the right side and 2+ on the left side.       Achilles reflexes are 2+ on the right side and 2+ on the left side.  Psychiatric:         Mood and Affect: Affect is flat.         Speech: Speech normal.         Behavior: Behavior is slowed.         Thought Content: Thought content normal.         Cognition and Memory: He exhibits impaired recent memory.         Judgment: Judgment normal.         MMSE 29/30, missed 1 for copying  MMSE 30/30 prior    Assessment/Plan:       Diagnoses and all orders for this visit:    1. MCI (mild cognitive impairment) (Primary)  -     donepezil (ARICEPT) 10 MG tablet; Take 1 tablet by mouth Daily.  Dispense: 90 tablet; Refill: 3    2. Parkinsonism, unspecified Parkinsonism type (HCC)  -     carbidopa-levodopa (SINEMET)  MG per tablet; Take 1 tab daily with food for 3 days then 1 tab bid with food for 3 days then 1 tab with food tid and continue  Dispense: 90 tablet; Refill: 3    3. Fahr's disease (HCC)  Comments:  continue aspirin and lipitor    4. History of CVA (cerebrovascular accident)  Comments:  continue aspirin and lipitor           Symptoms are likely multifactorial with Parkinsonisms and findings on exam.  He has not benefited from memantine or SSRI.  We will continue donepezil.  Memory overall seems stable.  We will add a trial of carbidopa-levodopa.  Will reevaluate in 2 months.  Also discussed the possibility of a vascular dementia or a vascular parkinsonism.  I did offer referral for speech-language pathology for cognitive rehab which she has  previously completed and they declined.  Also discussed the option of formal neurocognitive evaluation or second opinion with tertiary [facility] center and they want to wait for now.  Reviewed medications, potential side effects and signs and symptoms to report. Discussed risk versus benefits of treatment plan with patient and/or family-including medications, labs and radiology that may be ordered. Addressed questions and concerns during visit. Patient and/or family verbalized understanding and agree with plan.    AS THE PROVIDER, I PERSONALLY WORE PPE DURING ENTIRE FACE TO FACE ENCOUNTER IN CLINIC WITH THE PATIENT. PATIENT ALSO WORE PPE DURING ENTIRE FACE TO FACE ENCOUNTER EXCEPT FOR A MAX OF 30 SECONDS DURING NEUROLOGICAL EVALUATION OF CRANIAL NERVES AND THEN MASK WAS PLACED BACK OVER PATIENT FACE FOR REMAINDER OF VISIT. I WASHED MY HANDS BEFORE AND AFTER VISIT.    During this visit the following were done:  Labs Reviewed []    Labs Ordered []    Radiology Reports Reviewed []    Radiology Ordered []    PCP Records Reviewed []    Referring Provider Records Reviewed []    ER Records Reviewed []    Hospital Records Reviewed []    History Obtained From Family [x]    Radiology Images Reviewed []    Other Reviewed []    Records Requested []      Tawana Rodriguez, LIZZETH  1/5/2022

## 2022-04-07 ENCOUNTER — TELEPHONE (OUTPATIENT)
Dept: NEUROLOGY | Facility: CLINIC | Age: 72
End: 2022-04-07

## 2022-04-11 ENCOUNTER — TELEPHONE (OUTPATIENT)
Dept: NEUROLOGY | Facility: CLINIC | Age: 72
End: 2022-04-11

## 2022-04-11 DIAGNOSIS — G20 PARKINSONISM, UNSPECIFIED PARKINSONISM TYPE: ICD-10-CM

## 2022-04-11 NOTE — TELEPHONE ENCOUNTER
----- Message from Anisa Wilkins sent at 4/11/2022  9:36 AM EDT -----  Regarding: RESCHEDULE  I TALKED TO PATIENT'S WIFE ABOUT THE CONSTRUCTION AND THE CHANGE OF LOCATION TO [facility] FOR THE 4/13 APPT.  SHE SAID AT THIS TIME THERE WASN'T A REASON TO COME SINCE THE NEW MEDICATION,carbidopa-levodopa (SINEMET)  MG, DIDN'T SEEM TO CHANGE ANYTHING.  I TOLD HER I WOULD SEND A MESSAGE TO [facility] ABOUT THE MEDICATION, AND WE RESCHEDULED FOR 5/4.   SHE SAID HOPEFULLY IT WOULD BE BACK ON [facility], DUE TO THE DRIVE.

## 2022-04-11 NOTE — TELEPHONE ENCOUNTER
----- Message from Anisa Wilkins sent at 4/11/2022  9:36 AM EDT -----  Regarding: RESCHEDULE  I TALKED TO PATIENT'S WIFE ABOUT THE CONSTRUCTION AND THE CHANGE OF LOCATION TO [facility] FOR THE 4/13 APPT.  SHE SAID AT THIS TIME THERE WASN'T A REASON TO COME SINCE THE NEW MEDICATION,carbidopa-levodopa (SINEMET)  MG, DIDN'T SEEM TO CHANGE ANYTHING.  I TOLD HER I WOULD SEND A MESSAGE TO [facility] ABOUT THE MEDICATION, AND WE RESCHEDULED FOR 5/4.   SHE SAID HOPEFULLY IT WOULD BE BACK ON [facility], DUE TO THE DRIVE.

## 2022-04-13 NOTE — TELEPHONE ENCOUNTER
I would have him increase the carbidopa-levodopa to 1.5 tablets 3 times a day until we follow up in office. If he is still seeing no change, we can discuss further options in clinic. Thanks, LIZZETH Fuentes    I will send new script with directions to [facility] for 30 days with 1 refill until we see him.

## 2022-04-15 NOTE — TELEPHONE ENCOUNTER
I called and spoke with pt's wife, massiel, and she is aware of the medication increase until pt's f/u

## 2022-04-29 ENCOUNTER — TELEPHONE (OUTPATIENT)
Dept: NEUROLOGY | Facility: CLINIC | Age: 72
End: 2022-04-29

## 2022-04-29 NOTE — TELEPHONE ENCOUNTER
TALKED TO PATIENT'S WIFE, AND SHE DIDN'T WANT TO ATTEMPT A VIDEO OR A DOXY, AND WOULD RATHER WAIT UNTIL CENTER IS BACK OPEN.  I TOLD HER IF THEY HAD ANY PROBLEMS TO PLEASE CALL THE OFFICE.

## 2022-05-15 DIAGNOSIS — G20 PARKINSONISM, UNSPECIFIED PARKINSONISM TYPE: ICD-10-CM

## 2022-05-16 NOTE — TELEPHONE ENCOUNTER
Rx Refill Note  Requested Prescriptions     Pending Prescriptions Disp Refills   • carbidopa-levodopa (SINEMET)  MG per tablet [Pharmacy Med Name: CARBIDOPA-LEVODOPA  TAB] 135 tablet 1     Sig: TAKE 1.5 TABLETS BY MOUTH 3 TIMES A DAY. TAKE WITH FOOD      Last office visit with prescribing clinician: 1/5/2022      Next office visit with prescribing clinician: 6/29/2022            Angela Malhotra CMA  05/16/22, 08:02 EDT

## 2022-06-29 ENCOUNTER — OFFICE VISIT (OUTPATIENT)
Dept: NEUROLOGY | Facility: CLINIC | Age: 72
End: 2022-06-29

## 2022-06-29 VITALS
DIASTOLIC BLOOD PRESSURE: 66 MMHG | HEART RATE: 65 BPM | BODY MASS INDEX: 28.42 KG/M2 | WEIGHT: 203 LBS | SYSTOLIC BLOOD PRESSURE: 118 MMHG | OXYGEN SATURATION: 98 % | HEIGHT: 71 IN

## 2022-06-29 DIAGNOSIS — Z86.73 HISTORY OF CVA (CEREBROVASCULAR ACCIDENT): ICD-10-CM

## 2022-06-29 DIAGNOSIS — G31.84 MCI (MILD COGNITIVE IMPAIRMENT): Primary | ICD-10-CM

## 2022-06-29 DIAGNOSIS — G20 PARKINSONISM, UNSPECIFIED PARKINSONISM TYPE: ICD-10-CM

## 2022-06-29 PROCEDURE — 99214 OFFICE O/P EST MOD 30 MIN: CPT | Performed by: NURSE PRACTITIONER

## 2022-06-29 RX ORDER — DONEPEZIL HYDROCHLORIDE 10 MG/1
10 TABLET, FILM COATED ORAL DAILY
Qty: 90 TABLET | Refills: 3 | Status: SHIPPED | OUTPATIENT
Start: 2022-06-29

## 2022-06-29 NOTE — PATIENT INSTRUCTIONS
Home health referral.    Carbidopa-levodopa-take 1 tablet 3 times a day for 2 weeks then 1/2 tablet 3 times a day for 2 weeks, then 1/2 tablet twice a day for a week, 1/2 tablet once a day for a week and then stop. If no change in movement, continue off of the medicine.    After completely off the medicine, can call us to discuss adding sertraline (zoloft) low dose for depression.

## 2022-06-29 NOTE — PROGRESS NOTES
Subjective:     Patient ID: Christo Prince is a 72 y.o. male.    CC:   Chief Complaint   Patient presents with   • mild cognitive impairment   • Parkinson's Disease       HPI:   History of Present Illness  Today 6/29/2022- This is a 72-year-old male who presents with his wife for 6-month neurology follow-up on suspected mild cognitive impairment/vascular cognitive impairment. He has had symptoms since July 2016 after he had coronary artery bypass graft surgery at [facility] with primarily anomia in the absence of impaired comprehension or paraphasia's. This has worsened over time. Additional symptoms have included impairments in executive function. His symptoms have worsened under stress and he does currently live at home with his wife in [place], [place]. Prior work-up has included screening blood work, which was unremarkable and an MRI of the brain with notable multiple punctate bilateral infarcts including one frontally on the left in 2016 following CABG. He was initially evaluated by Dr. Shabbir Su prior memory specialist on 10/14/2020 and suspected to have mild cognitive vascular impairment with possible parkinsonism. He has been taking donepezil 10 mg daily, was previously on memantine for about a year, but really saw no improvement. He was previously on Lexapro to help with some depression and anxiety, but never saw benefit, so this was also discontinued.     At his last visit on 1/5/2022 his wife had noted some slow decline overall but has not noted tremors, but has had some repetitive movements in his hands. Has had difficulty with balance. Has completed physical therapy. His wife had noticed decreased facial expression, some shuffling gait and very slow movements. He has had a lot of word finding difficulties. He was showing parkinsonism features and so we did order carbidopa-levodopa  mg 1 tablet three times a day to help with his symptoms. 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     Today, the patient reports that his memory has worsened. He reports that he is getting around the house fine and does not need to use a cane or walker. He has not participated in physical therapy since his last appointment. The patient's wife reports that he has occasional swallowing issues. He reports that his appetite is good. The patient's wife fixes his meals for him. She will have to help him shower because he will forget what he is supposed to do. The patient's wife reports that she may come home at 3 o' clock and he has not eaten anything because he did not want anything. He did not feel any effect when he was on Lexapro. The patient does not have any issues with dressing himself. Carbidopa-levodopa was increased to 1.5 tablets 3 times a day in May 2022, but besides no more falls, he and his wife cannot see much change.    His wife did fill out a family intake form today and has been concerned about difficulties with sequencing such as him taking a shower eating and controlling his consumption of sweets. He is having incontinence, which has worsened and he has not been aware that he is having these accidents. When he is home alone. She notes that he may not eat at all until she comes back even though he has easy access to foods.    His wife reports that his incontinence has gotten worse from 2016 until current day. She mentions that he is not aware that he has gone to the bathroom. The patient reports that he sometimes has the urge to go to the bathroom. He does wear depends daily. The patient has completed multiple tests through his urologist to determine what is causing his incontinence; any physiological or structural cause was ruled out per his wife's report.     The patient reports that physical therapy has been helpful for him in the past. The last time he was in physical therapy was in December, sent through his primary care provider.      His son visited him a couple of weeks ago and told his mother that he had never seen his dad so depressed. [facility] and [facility] was going great. He cannot go now with incontinence. His wife reports that when he is reading he will read the front page many times and not progress any farther in the book. He loves watching the history channel.     The patient's wife reports that she would not leave him overnight because she is nervous that he would not feed himself. His wife reports that when he is together with his family he does much better. His wife reports that when he is having bad day he will be blank and not want to talk about anything.     The patient has four grandchildren that play baseball, which he loves watching. The patient's wife states that he will go into the backyard and play baseball with his grandchildren.     His wife reports that when he is going down the stairs he will go down them really fast. This is concerning for her, but he has not fallen because there is a wall he can catch himself on.   He also has Fahr disease familial cerebral Ferrocalcinosis history.    The following portions of the patient's history were reviewed and updated as appropriate: allergies, current medications, past family history, past medical history, past social history, past surgical history and problem list.    Past Medical History:   Diagnosis Date   • Coronary artery disease    • Migraines        Past Surgical History:   Procedure Laterality Date   • CORONARY ARTERY BYPASS GRAFT N/A 7/29/2016    Procedure: INTRAOPERATIVE FRANCES, MIDLINE STERNOTOMY WITH CORONARY ARTERY BYPASS GRAPHS X 5 UTILIZING BILTERAL ANGELA'S AND LEFT ENDOSCOPIC HARVESTED SAPHENOUS VEIN, AND RIGHT OPEN HARVESTED SAPHENOUS VEIN;  Surgeon: De Montgomery MD;  Location: [facility];  Service:    • KNEE SURGERY Right        Social History     Socioeconomic History   • Marital status:    Tobacco Use   • Smoking  status: Never Smoker   • Smokeless tobacco: Never Used   Vaping Use   • Vaping Use: Never used   Substance and Sexual Activity   • Alcohol use: No   • Drug use: No   • Sexual activity: Not Currently       Family History   Problem Relation Age of Onset   • Alzheimer's disease Mother    • Heart disease Father           Current Outpatient Medications:   •  aspirin 81 MG EC tablet, Take 1 tablet by mouth daily., Disp: , Rfl:   •  atorvastatin (LIPITOR) 40 MG tablet, Take 1 tablet by mouth every night., Disp: 30 tablet, Rfl: 2  •  carbidopa-levodopa (SINEMET)  MG per tablet, TAKE 1.5 TABLETS BY MOUTH 3 TIMES A DAY. TAKE WITH FOOD, Disp: 135 tablet, Rfl: 0  •  donepezil (ARICEPT) 10 MG tablet, Take 1 tablet by mouth Daily., Disp: 90 tablet, Rfl: 3  •  Multiple Vitamin (MULTI VITAMIN DAILY PO), Take  by mouth., Disp: , Rfl:   •  Probiotic Product (PROBIOTIC-10 PO), Take  by mouth., Disp: , Rfl:   •  Docusate Calcium (STOOL SOFTENER PO), Take  by mouth 2 (two) times a day., Disp: , Rfl:      Review of Systems   Constitutional: Negative for chills, fatigue, fever and unexpected weight change.   HENT: Negative for ear pain, hearing loss, nosebleeds, rhinorrhea and sore throat.    Eyes: Negative for photophobia, pain, discharge, itching and visual disturbance.   Respiratory: Negative for cough, chest tightness, shortness of breath and wheezing.    Cardiovascular: Negative for chest pain, palpitations and leg swelling.   Gastrointestinal: Negative for abdominal pain, blood in stool, constipation, diarrhea, nausea and vomiting.   Genitourinary: Negative for dysuria, frequency, hematuria and urgency.   Musculoskeletal: Positive for gait problem. Negative for arthralgias, back pain, joint swelling, myalgias, neck pain and neck stiffness.   Skin: Negative for rash and wound.   Allergic/Immunologic: Negative for environmental allergies and food allergies.   Neurological: Negative for dizziness, tremors, seizures, syncope,  "speech difficulty, weakness, light-headedness, numbness and headaches.   Hematological: Negative for adenopathy. Does not bruise/bleed easily.   Psychiatric/Behavioral: Positive for behavioral problems, decreased concentration and dysphoric mood. Negative for agitation, confusion, hallucinations, sleep disturbance and suicidal ideas. The patient is not nervous/anxious.    All other systems reviewed and are negative.       Objective:  /66   Pulse 65   Ht 180.3 cm (71\")   Wt 92.1 kg (203 lb)   SpO2 98%   BMI 28.31 kg/m²     Neurologic Exam     Mental Status   Oriented to person, place, and time.   Speech: speech is normal   Level of consciousness: alert    Cranial Nerves   Cranial nerves II through XII intact.     Motor Exam   Muscle bulk: normal  Right arm tone: cogwheel rigidity  Left arm tone: normal  Right leg tone: normal  Left leg tone: normal    Strength   Strength 5/5 throughout.     Gait, Coordination, and Reflexes     Gait  Gait: shuffling and wide-based (some forward leaning)    Coordination   Finger to nose coordination: normal    Tremor   Resting tremor: absent  Intention tremor: absent  Action tremor: absent  Decreased finger and heels taps right, slightly improved today       Physical Exam  Constitutional:       Appearance: Normal appearance.   Neurological:      Mental Status: He is alert and oriented to person, place, and time.      Coordination: Finger-Nose-Finger Test normal.      Deep Tendon Reflexes: Strength normal.   Psychiatric:         Mood and Affect: Affect is flat.         Speech: Speech normal.         Behavior: Behavior is slowed.         Thought Content: Thought content normal.         Cognition and Memory: Memory is impaired.         Judgment: Judgment is inappropriate.       Results: MMSE at last visit was 29 out of 30 and prior visit was 30 out of 30.   Gus cognitive assessment score today is a 23 out of 30 with 0 out of 5 for un-queued word recall and 2 out of 5 for " cued word recall. He was not able to complete the fluency section, and otherwise he essentially scored within normal limits.      Assessment/Plan:       Diagnoses and all orders for this visit:    1. MCI (mild cognitive impairment) (Primary)  -     Ambulatory Referral to Home Health  -     donepezil (ARICEPT) 10 MG tablet; Take 1 tablet by mouth Daily.  Dispense: 90 tablet; Refill: 3    2. History of CVA (cerebrovascular accident)  -     Ambulatory Referral to Home Health    3. Parkinsonism, unspecified Parkinsonism type (HCC)  Comments:  vascular  Orders:  -     Ambulatory Referral to Home Health            Suspected mild cognitive impairment/vascular cognitive impairment.   I did supply his wife with information on the [facility] agency on aging. We will make  referral, physical therapy referral and occupational therapy in the home to see if there are other additional day care services for the patient to help with better socialization. I provided her with the titration and wean off the carbidopa- levodopa as well as additional recommendations. They will call us with any questions or concerns prior to next follow-up.    We did discuss etiology of symptoms likely a vascular parkinsonism with vascular cognitive impairment. He will follow up in 3 months or sooner if needed.     Gus cognitive assessment score 23 out of 30, 0 out of 5 recall, still consistent with mild cognitive and vascular impairment. Incontinence has worsened and urology work-up has been unremarkable. Likely progression of vascular, changes.    Home health referral-PT/OT/MSW    Carbidopa-levodopa-take 1 tablet 3 times a day for 2 weeks then 1/2 tablet 3 times a day for 2 weeks, then 1/2 tablet twice a day for a week, 1/2 tablet once a day for a week and then stop. If no change in movement, continue off of the medicine. If unable to stop medicine and any symptoms worsen, they should call us.    After completely off the medicine,  can call us to discuss adding sertraline (zoloft) low dose for depression.    Reviewed medications, potential side effects and signs and symptoms to report. Discussed risk versus benefits of treatment plan with patient and/or family-including medications, labs and radiology that may be ordered. Addressed questions and concerns during visit. Patient and/or family verbalized understanding and agree with plan.    AS THE PROVIDER, I PERSONALLY WORE PPE DURING ENTIRE FACE TO FACE ENCOUNTER IN CLINIC WITH THE PATIENT. PATIENT ALSO WORE PPE DURING ENTIRE FACE TO FACE ENCOUNTER EXCEPT FOR A MAX OF 30 SECONDS DURING NEUROLOGICAL EVALUATION OF CRANIAL NERVES AND THEN MASK WAS PLACED BACK OVER PATIENT FACE FOR REMAINDER OF VISIT. I WASHED MY HANDS BEFORE AND AFTER VISIT.    During this visit the following were done:  Labs Reviewed []    Labs Ordered []    Radiology Reports Reviewed [x]    Radiology Ordered []    PCP Records Reviewed []    Referring Provider Records Reviewed []    ER Records Reviewed []    Hospital Records Reviewed []    History Obtained From Family [x] wife   Radiology Images Reviewed []    Other Reviewed []    Records Requested []      Transcribed from ambient dictation for LIZZETH Syed by ZEESHAN TURNER.  06/29/22   19:02 EDT    Patient verbalized consent to the visit recording.  I have personally performed the services described in this document as transcribed by the above individual, and it is both accurate and complete.  LIZZETH Syed  6/30/2022  07:51 EDT

## 2022-07-01 ENCOUNTER — TELEPHONE (OUTPATIENT)
Dept: NEUROLOGY | Facility: CLINIC | Age: 72
End: 2022-07-01

## 2022-07-01 ENCOUNTER — HOME HEALTH ADMISSION (OUTPATIENT)
Dept: HOME HEALTH SERVICES | Facility: HOME HEALTHCARE | Age: 72
End: 2022-07-01

## 2022-07-01 NOTE — TELEPHONE ENCOUNTER
To my knowledge this patient has never had a single seizure.  We have never treated him for seizures.  We just lowered his Parkinson's medicine dosage 2 days ago to 1 pill 3 times a day instead of 1-1/2 pills 3 times a day.  This has nothing to do with seizures and would not cause any seizures. She needs to take him to [facility] emergency department immediately for further evaluation and neurological consultation.

## 2022-07-05 NOTE — TELEPHONE ENCOUNTER
[facility]- Pediatric Dermatology  Dr. Gisel Santoro, Dr. Suzie Rivera, Dr. Malissa Mendoza, Dr. Dennise Rivas, Dr. Kwesi Puente       Pediatric Appointment Scheduling and Call Center [phone]     Non Urgent -Triage Voicemail Line; [phone]- Aide and Brianna RN's. Messages are checked periodically throughout the day and are returned as soon as possible.      Clinic Fax number: [phone]    If you need a prescription refill, please contact your pharmacy. They will send us an electronic request. Refills are approved or denied by our Physicians during normal business hours, Monday through Fridays    Per office policy, refills will not be granted if you have not been seen within the past year (or sooner depending on your child's condition)    *Radiology Scheduling- [phone]  *Sedation Unit Scheduling- [phone]  *[place] Scheduling- General [phone]; Pediatric Dermatology [phone]  *Main  Services: [phone]   Irish: [phone]   Guinean: [phone]   Hmong/Senegalese/Hi: [phone]    For urgent matters that cannot wait until the next business day, is over a holiday and/or a weekend please call [phone] and ask for the Dermatology Resident On-Call to be paged.                                                Pediatric Dermatology  [address] [address]  [phone]    SUN PROTECTION    WHY PROTECT AGAINST THE SUN?  In the past, sun exposure was thought to be a healthy benefit of outdoor activity. However, studies have shown many unhealthy effects of sun exposure, such as early aging of the skin and skin cancer.    WHAT KIND OF DAMAGE DOES THE SUN EXPOSURE CAUSE?  Part of the sun s energy that reaches earth is composed of rays of invisible ultraviolet (UV) light. When ultraviolet light rays (UVA and UVB) enter the skin, they damage skin cells, causing visible and  Mailed pt a letter   invisible injuries.    Sunburn is a visible type of damage, which appears just a few hours after sun exposure. In many people this type of damage also causes tanning. Freckles, which occur in people with fair skin, are usually due to sun exposure. Freckles are nearly always a sign that sun damage has occurred, and therefore show the need for sun protection.    Ultraviolet light rays also cause invisible damage to skin cells. Some of the injury is repaired but some of the cell damage adds up year after year. After 20-30 years or more, the built-up damage appears as wrinkles, age spots and even skin cancer.  Although window glass blocks UVB light, UVA rays are able to penetrate through the glass.    HOW CAN I PROTECT MY CHILD FROM EXCESSIVE SUN EXPOSURE?  1. Avoidance. Plan your activities to avoid being in the sun in the middle of the day. Sun exposure is more intense closer to the equator, in the mountains and in the summer. The sun s damaging effects are increased by reflection from water, white sand and snow. Avoid long periods of direct sun exposure. Sit or play in the shade, especially when your shadow is shorter then you are tall.   2. Use protective clothing.  Cover up with light colored clothing when outdoors including a hat to protect the scalp and face. In addition to filtering out the sun, tightly woven clothing reflects heat and helps keep you feeling cool. Sunglasses that block ultraviolet rays protect the eyes and eyelids. Multiple retailers now sell clothing and swimwear for adults and children that is made of special fabric that protects against the sun.    3. Apply a broad-spectrum UVA and UVB sunscreen with an SPF of 30 of higher and reapply approximately every two hours, even on cloudy days. If swimming or participating in intense physical activity, sunscreen may need to be applied more often.   4. Infants should be kept out of direct sun and be covered by protective clothing when possible. If sun  exposure is unavoidable, sunscreen should be applied to exposed areas (i.e. face, hands).    IS SUNSCREEN SAFE?  Hats, clothing and shade are the most reliable forms of sun protection, but sunscreen is also an important part of protecting your child from the sun. Some have raised concerns about chemical sunscreens and the dangers of absorption. Most of this concern is theoretical,  and our providers would be happy to discuss this with you.  Most dermatologists agree that the risk of unprotected sun exposure far outweighs the theoretical risks of sunscreens.      WHAT IF MY CHILD HAS SENSITIVE SKIN?  The following sunscreens may be better for your child s sensitive skin. The main active ingredients are inert, either titanium dioxide or zinc oxide. These ingredients are less irritating than chemical sunscreens.   Be wary of the word  baby  or  organic : these words don t always mean that the product is hypoallergenic.  Please also note that this list is not all-inclusive, and that we do not formally endorse any of these products.     Aveeno Active Natural Protection Mineral Block Lotion SPF 30  Aveeno Baby Natural Protection Face Stick SPF 50+  Banana Boat Natural Reflect (baby or kids) SPF 50+  [place] s Bees Chemical-Free Sunscreen SPF 30  Blue Lizard Baby SPF 30+  Blue Lizard for Sensitive Skin SPF 30+  Cotz Pediatric Pure SPF 30  Cotz Pediatric Face SPF 40  Cotz 20% Zinc SPF 35  CVS Sensitive Skin 30  CVS Baby Lotion Sunscreen SPF 60+  Mustella Broad Spectrum SPF 50+/Mineral Sunscreen Stick  Neutrogena Sensitive Skin- Pure and Free Baby SPF 30  Neutrogena Sensitive Skin-Pure and Free Baby  SPF 50+  Think Baby SPF 50+ Sunscreen  Think sport SPF 50+ Sunscreen  PreSun Sensitive Sunblock SPF 28  Vanicream Sunscreen for Sensitive Skin SPF 60  [employer] s Sensitive Skin SPF 70    WHERE CAN I BUY SUN PROTECTIVE CLOTHING AND SWIMWEAR?   Many retailers sell these products.  Coolibar, Solumbra, Sunday Afternoons, and Athleta  are some examples.  Many other popular children s brands have started selling UV protective swimwear, and we recommend swimsuits that include swim shirts and don t leave extra skin exposed.   UV protective products can also be washed into clothing (eg: Rit Sun Guard Laundry UV Protectant).     SHOULD I WORRY ABOUT MY CHILD NOT GETTING ENOUGH VITAMIN D?  Vitamin D is essential for many processes in the body, and it is important for bone growth in children.  But while the sun is one source of vitamin D, it is also the source of harmful ultraviolet radiation resulting in thousands of skin cancers each year. The official recommendation of the American Academy of Dermatology (AAD) is that vitamin D should be obtained through dietary sources and supplementation rather than from sunlight.     For more information on sun safety and more FAQs about sun protection, visit:  http://www.aad.org/media-resources/stats-and-facts/prevention-and-care/sunscreens

## 2022-07-09 DIAGNOSIS — G20 PARKINSONISM, UNSPECIFIED PARKINSONISM TYPE: ICD-10-CM

## 2022-07-11 NOTE — TELEPHONE ENCOUNTER
Rx Refill Note  Requested Prescriptions     Pending Prescriptions Disp Refills   • carbidopa-levodopa (SINEMET)  MG per tablet [Pharmacy Med Name: CARBIDOPA-LEVODOPA  TAB] 135 tablet 0     Sig: TAKE 1 AND 1/2 TABLETS BY MOUTH 3 TIMES A DAY. TAKE WITH FOOD      Last office visit with prescribing clinician: 6/29/2022      Next office visit with prescribing clinician: 9/21/2022            Angela Malhotra CMA  07/11/22, 08:07 EDT

## 2022-09-26 ENCOUNTER — TELEPHONE (OUTPATIENT)
Dept: NEUROLOGY | Facility: CLINIC | Age: 72
End: 2022-09-26

## 2022-09-26 DIAGNOSIS — Z86.73 HISTORY OF CVA (CEREBROVASCULAR ACCIDENT): ICD-10-CM

## 2022-09-26 DIAGNOSIS — G31.84 MCI (MILD COGNITIVE IMPAIRMENT): ICD-10-CM

## 2022-09-26 DIAGNOSIS — G20 PARKINSONISM, UNSPECIFIED PARKINSONISM TYPE: Primary | ICD-10-CM

## 2022-09-26 NOTE — TELEPHONE ENCOUNTER
Provider: NEO  Caller: LIZZY NIELSON  Relationship to Patient: N/A  Pharmacy: N/A  Phone Number: [phone]  Reason for Call: IS NEEDING ORDERS FOR OCCUPATIONAL THERAPY FOR PATIENT 1X WEEK FOR 5 WEEKS.    PLEASE CALL.    THANK YOU.

## 2022-09-27 NOTE — TELEPHONE ENCOUNTER
I called and left detailed message requesting a fax # to fax the OT order.  Okay for hub to take the # and send to office.

## 2024-04-30 ENCOUNTER — OFFICE VISIT (OUTPATIENT)
Dept: NEUROLOGY | Facility: CLINIC | Age: 74
End: 2024-04-30
Payer: MEDICARE

## 2024-04-30 VITALS
DIASTOLIC BLOOD PRESSURE: 66 MMHG | RESPIRATION RATE: 12 BRPM | WEIGHT: 188.2 LBS | SYSTOLIC BLOOD PRESSURE: 126 MMHG | HEIGHT: 72 IN | BODY MASS INDEX: 25.49 KG/M2 | OXYGEN SATURATION: 98 % | HEART RATE: 72 BPM

## 2024-04-30 DIAGNOSIS — R25.8 BRADYKINESIA: ICD-10-CM

## 2024-04-30 DIAGNOSIS — R26.89 BALANCE DISORDER: Primary | ICD-10-CM

## 2024-04-30 DIAGNOSIS — R55 SYNCOPE AND COLLAPSE: ICD-10-CM

## 2024-04-30 DIAGNOSIS — Z91.81 AT HIGH RISK FOR FALLS: ICD-10-CM

## 2024-04-30 DIAGNOSIS — R29.898 LEFT LEG WEAKNESS: ICD-10-CM

## 2024-04-30 PROCEDURE — 99214 OFFICE O/P EST MOD 30 MIN: CPT | Performed by: NURSE PRACTITIONER

## 2024-04-30 PROCEDURE — 3078F DIAST BP <80 MM HG: CPT | Performed by: NURSE PRACTITIONER

## 2024-04-30 PROCEDURE — 3074F SYST BP LT 130 MM HG: CPT | Performed by: NURSE PRACTITIONER

## 2024-04-30 RX ORDER — FAMOTIDINE 20 MG/1
20 TABLET, FILM COATED ORAL 2 TIMES DAILY
COMMUNITY
Start: 2024-01-24

## 2024-04-30 RX ORDER — LEVETIRACETAM 750 MG/1
1 TABLET ORAL EVERY 12 HOURS SCHEDULED
COMMUNITY
Start: 2024-02-23

## 2024-04-30 NOTE — PROGRESS NOTES
Neuro Office Visit      Encounter Date: 2024   Patient Name: Christo Prince  : 1950   MRN: [id number]     Chief Complaint:    Chief Complaint   Patient presents with    Dementia    Seizures    epilepsy and vascular dementia       History of Present Illness: Christo Prince is a 74 y.o. male who is here today in Neurology for  seizures.    Previous patient of LIZZETH Fuentes last seen 2022-[facility] and [facility].    Syncope vs Seizure  Admitted to [facility]  [facility] for seizure like activity. Found to be in Rapid A-fib. EEG-neg  Taking Keppra 500mg  tab bid.     Wife describes multiple episodes over the years of the patient falling to the ground.  Some episodes occur while standing to urinate. He will become pale with a blank look in his eyes and legs give out. He falls to the ground. There is not LOC but he is non-communicative. Denies eye rolling, foaming, tongue biting or tonic-clonic movement. Episode duration is 5-7 minutes and he becomes more alert. Pt denies any warning such as unusual tastes or smells. He is not confused after the event.    There are other events that occur when he is not urinating. Last event occurred sitting in bleacher watching a ball game. He arched his back and fell backward with blank look in his eye. He was incontinent. Again no tonic-clonic movement or other signs of seizure. He was fully aware during the episode.    He has a no history of stroke but has CAD and presumed vascular dementia. Wife states he always performs well on the dementia screening tools. He has not had an episode since starting Keppra 3 months ago and denies any side effects.    FRANCES  with nml LVED  EEG     Dementia Vascular/Fahr's Disease  Meds:donepezil 10  Appetite:good  Sleep:no disturbing dreams  ADLs:independent  Gait/Falls:None this year  Language:no slurred speech  Dysphagia:none  Driving:none  Hallucinations:none  Behavior:no unusual behaviors  Living situation:w  wife        Parkinsonism  Pt with masked face, bradykinesias, stiffness and impaired writing. Symptoms have been present for around 5-7 years. He denies trouble rolling over in bed, freezing, hallucinations. He can have trouble with initiation of gait and falls backward. He is incontinent of urine and stool and does not recognize the urge to evacuate nor is he aware he has passed stool. He served in [place] and was exposed to Agent Orange.    History of CVA  Started DOAC w xarelto 20 w asa and statin.     PMH: CAD, CABG, OA, seizure, A-fib, dementia, bladder stimulator  FH: dementia  SH: retired, , -tob, -etoh, -durg.  Subjective      Past Medical History:   Past Medical History:   Diagnosis Date    Coronary artery disease     Migraines        Past Surgical History:   Past Surgical History:   Procedure Laterality Date    CORONARY ARTERY BYPASS GRAFT N/A 7/29/2016    Procedure: INTRAOPERATIVE FRANCES, MIDLINE STERNOTOMY WITH CORONARY ARTERY BYPASS GRAPHS X 5 UTILIZING BILTERAL ANGELA'S AND LEFT ENDOSCOPIC HARVESTED SAPHENOUS VEIN, AND RIGHT OPEN HARVESTED SAPHENOUS VEIN;  Surgeon: De Montgomery MD;  Location: [facility];  Service:     KNEE SURGERY Right        Family History:   Family History   Problem Relation Age of Onset    Alzheimer's disease Mother     Heart disease Father        Social History:   Social History     Socioeconomic History    Marital status:    Tobacco Use    Smoking status: Never    Smokeless tobacco: Never   Vaping Use    Vaping status: Never Used   Substance and Sexual Activity    Alcohol use: No    Drug use: No    Sexual activity: Not Currently       Medications:     Current Outpatient Medications:     aspirin 81 MG EC tablet, Take 1 tablet by mouth daily., Disp: , Rfl:     atorvastatin (LIPITOR) 40 MG tablet, Take 1 tablet by mouth every night., Disp: 30 tablet, Rfl: 2    Docusate Calcium (STOOL SOFTENER PO), Take  by mouth 2 (two) times a day., Disp: , Rfl:     donepezil  (ARICEPT) 10 MG tablet, Take 1 tablet by mouth Daily., Disp: 90 tablet, Rfl: 3    famotidine (PEPCID) 20 MG tablet, Take 1 tablet by mouth 2 (Two) Times a Day., Disp: , Rfl:     levETIRAcetam (KEPPRA) 750 MG tablet, Take 1 tablet by mouth Every 12 (Twelve) Hours., Disp: , Rfl:     metoprolol tartrate (LOPRESSOR) 25 MG tablet, Take 1 tablet by mouth Every 12 (Twelve) Hours., Disp: , Rfl:     Multiple Vitamin (MULTI VITAMIN DAILY PO), Take  by mouth., Disp: , Rfl:     Probiotic Product (PROBIOTIC-10 PO), Take  by mouth., Disp: , Rfl:     rivaroxaban (XARELTO) 20 MG tablet, Take 1 tablet by mouth Daily., Disp: , Rfl:     Allergies:   Allergies   Allergen Reactions    Codeine Irritability       PHQ-9 Total Score:     SARAH Fall Risk Assessment was completed, and patient is at HIGH risk for falls. Assessment completed on:4/30/2024    Objective     Physical Exam:   Physical Exam  Eyes:      Pupils: Pupils are equal, round, and reactive to light.   Neurological:      Mental Status: He is oriented to person, place, and time.      Coordination: Finger-Nose-Finger Test abnormal and Heel to Shin Test abnormal. Romberg Test normal.      Gait: Gait is intact. Tandem walk abnormal.      Deep Tendon Reflexes:      Reflex Scores:       Tricep reflexes are 2+ on the right side and 2+ on the left side.       Bicep reflexes are 2+ on the right side and 2+ on the left side.       Brachioradialis reflexes are 2+ on the right side and 2+ on the left side.       Patellar reflexes are 2+ on the right side and 2+ on the left side.       Achilles reflexes are 2+ on the right side and 2+ on the left side.        Neurologic Exam     Mental Status   Oriented to person, place, and time.   Follows 3 step commands.   Attention: decreased. Concentration: decreased.   Level of consciousness: alert  Knowledge: consistent with education.   Normal comprehension.   Masked facies. Speech is not fluid     Cranial Nerves     CN III, IV, VI   Pupils are  "equal, round, and reactive to light.  Right pupil: Accommodation: intact.   Left pupil: Accommodation: intact.   CN III: no CN III palsy  CN VI: no CN VI palsy  Nystagmus: none   Diplopia: none  Upgaze: normal  Downgaze: normal  Conjugate gaze: present    CN VIII   Hearing: intact    CN XII   CN XII normal.   Masked face. Keeps his eyes closed for most of interview     Motor Exam   Muscle bulk: normal  Overall muscle tone: normal    Strength   Right biceps: 4/5  Left biceps: 4/5  Right triceps: 4/5  Left triceps: 4/5  Right interossei: 4/5  Left interossei: 4/5  Right quadriceps: 4/5  Left quadriceps: 4/5  Right anterior tibial: 4/5  Left anterior tibial: 4/5  Right posterior tibial: 4/5  Left posterior tibial: 4/5Obvious bradykinesia     Sensory Exam   Right arm light touch: normal  Left arm light touch: normal  Right leg light touch: decreased from toes  Left leg light touch: decreased from toes    Gait, Coordination, and Reflexes     Gait  Gait: normal    Coordination   Romberg: negative  Finger to nose coordination: abnormal  Heel to shin coordination: abnormal  Tandem walking coordination: abnormal    Tremor   Resting tremor: absent  Action tremor: left arm and right arm    Reflexes   Right brachioradialis: 2+  Left brachioradialis: 2+  Right biceps: 2+  Left biceps: 2+  Right triceps: 2+  Left triceps: 2+  Right patellar: 2+  Left patellar: 2+  Right achilles: 2+  Left achilles: 2+  Right : 2+  Left : 2+       Vital Signs:   Vitals:    04/30/24 1458   BP: 126/66   BP Location: Right arm   Pulse: 72   Resp: 12   SpO2: 98%   Weight: 85.4 kg (188 lb 3.2 oz)   Height: 182.9 cm (72\")  Comment: Noted from Patient     Body mass index is 25.52 kg/m².         Assessment / Plan      Assessment/Plan:   Diagnoses and all orders for this visit:    1. Balance disorder (Primary)  -     Cancel: EMG & Nerve Conduction Test; Future  -     Paraneoplastic Autoantibody Evalulation; Future  -     Anti-Myelin Oligodendrocyte " Glycoprotein (MOG), Serum; Future  -     Neuromyelitis Optica (NMO) Auto Antibody, IgG; Future  -     Vitamin B12 & Folate; Future  -     CK; Future  -     C-reactive Protein; Future  -     JACQUELINE + PE; Future  -     Ammonia; Future  -     Levetiracetam Level (Keppra); Future  -     EMG & Nerve Conduction Test; Future    2. At high risk for falls  -     Cancel: EMG & Nerve Conduction Test; Future  -     Paraneoplastic Autoantibody Evalulation; Future  -     Anti-Myelin Oligodendrocyte Glycoprotein (MOG), Serum; Future  -     Neuromyelitis Optica (NMO) Auto Antibody, IgG; Future  -     Vitamin B12 & Folate; Future  -     CK; Future  -     C-reactive Protein; Future  -     JACQUELINE + PE; Future  -     Ammonia; Future  -     Levetiracetam Level (Keppra); Future  -     EMG & Nerve Conduction Test; Future    3. Syncope and collapse  -     Paraneoplastic Autoantibody Evalulation; Future  -     Anti-Myelin Oligodendrocyte Glycoprotein (MOG), Serum; Future  -     Neuromyelitis Optica (NMO) Auto Antibody, IgG; Future  -     Vitamin B12 & Folate; Future  -     CK; Future  -     C-reactive Protein; Future  -     JACQUELINE + PE; Future  -     Ammonia; Future  -     Levetiracetam Level (Keppra); Future    4. Left leg weakness  -     Paraneoplastic Autoantibody Evalulation; Future  -     Anti-Myelin Oligodendrocyte Glycoprotein (MOG), Serum; Future  -     Neuromyelitis Optica (NMO) Auto Antibody, IgG; Future  -     Vitamin B12 & Folate; Future  -     CK; Future  -     C-reactive Protein; Future  -     JACQUELINE + PE; Future  -     Ammonia; Future  -     Levetiracetam Level (Keppra); Future    5. Bradykinesia  -     Acetylcholine Receptor Antibody Panel; Future             Patient Education:       Reviewed medications, potential side effects and signs and symptoms to report. Discussed risk versus benefits of treatment plan with patient and/or family-including medications, labs and radiology that may be ordered. Addressed questions and concerns during  visit. Patient and/or family verbalized understanding and agree with plan. Instructed to call the office with any questions and report to ER with any life-threatening symptoms.     Follow Up:   Return in about 3 months (around 7/30/2024) for Recheck.    During this visit the following were done:  Labs Reviewed []    Labs Ordered []    Radiology Reports Reviewed []    Radiology Ordered []    PCP Records Reviewed []    Referring Provider Records Reviewed []    ER Records Reviewed []    Hospital Records Reviewed []    History Obtained From Family []    Radiology Images Reviewed []    Other Reviewed []    Records Requested []      Soraya Ryder, DNP, APRN

## 2024-04-30 NOTE — LETTER
May 2, 2024     CARROL Villa  [address]  [facility] [address]    Patient: Christo Prince   YOB: 1950   Date of Visit: 2024     Dear CARROL Villa:       Thank you for referring Christo Prince to me for evaluation. Below are the relevant portions of my assessment and plan of care.    If you have questions, please do not hesitate to call me. I look forward to following Christo along with you.         Sincerely,        Soraya Ryder DNP, LIZZETH        CC: No Recipients    Soraya Ryder DNP, LIZZETH  [phone]  Signed     Neuro Office Visit      Encounter Date: 2024   Patient Name: Christo Prince  : 1950   MRN: [id number]     Chief Complaint:    Chief Complaint   Patient presents with   • Dementia   • Seizures   • epilepsy and vascular dementia       History of Present Illness: Christo Prince is a 74 y.o. male who is here today in Neurology for  seizures.    Previous patient of LIZZETH Fuentes last seen 2022-[facility].    Syncope vs Seizure  Admitted to [facility] for seizure like activity. Found to be in Rapid A-fib. EEG-neg  Taking Keppra 500mg  tab bid.     Wife describes multiple episodes over the years of the patient falling to the ground.  Some episodes occur while standing to urinate. He will become pale with a blank look in his eyes and legs give out. He falls to the ground. There is not LOC but he is non-communicative. Denies eye rolling, foaming, tongue biting or tonic-clonic movement. Episode duration is 5-7 minutes and he becomes more alert. Pt denies any warning such as unusual tastes or smells. He is not confused after the event.    There are other events that occur when he is not urinating. Last event occurred sitting in bleacher watching a ball game. He arched his back and fell backward with blank look in his eye. He was incontinent. Again no tonic-clonic movement or other signs of seizure.  He was fully aware during the episode.    He has a no history of stroke but has CAD and presumed vascular dementia. Wife states he always performs well on the dementia screening tools. He has not had an episode since starting Keppra 3 months ago and denies any side effects.    FRANCES 2020 with nml LVED  EEG     Dementia Vascular/Fahr's Disease  Meds:donepezil 10  Appetite:good  Sleep:no disturbing dreams  ADLs:independent  Gait/Falls:None this year  Language:no slurred speech  Dysphagia:none  Driving:none  Hallucinations:none  Behavior:no unusual behaviors  Living situation:w wife        Parkinsonism  Pt with masked face, bradykinesias, stiffness and impaired writing. Symptoms have been present for around 5-7 years. He denies trouble rolling over in bed, freezing, hallucinations. He can have trouble with initiation of gait and falls backward. He is incontinent of urine and stool and does not recognize the urge to evacuate nor is he aware he has passed stool. He served in [place] and was exposed to Agent Orange.    History of CVA  Started DOAC w xarelto 20 w asa and statin.     PMH: CAD, CABG, OA, seizure, A-fib, dementia, bladder stimulator  FH: dementia  SH: retired, , -tob, -etoh, -durg.  Subjective      Past Medical History:   Past Medical History:   Diagnosis Date   • Coronary artery disease    • Migraines        Past Surgical History:   Past Surgical History:   Procedure Laterality Date   • CORONARY ARTERY BYPASS GRAFT N/A 7/29/2016    Procedure: INTRAOPERATIVE FRANCES, MIDLINE STERNOTOMY WITH CORONARY ARTERY BYPASS GRAPHS X 5 UTILIZING BILTERAL ANGELA'S AND LEFT ENDOSCOPIC HARVESTED SAPHENOUS VEIN, AND RIGHT OPEN HARVESTED SAPHENOUS VEIN;  Surgeon: De Montgomery MD;  Location: [facility];  Service:    • KNEE SURGERY Right        Family History:   Family History   Problem Relation Age of Onset   • Alzheimer's disease Mother    • Heart disease Father        Social History:   Social History      Socioeconomic History   • Marital status:    Tobacco Use   • Smoking status: Never   • Smokeless tobacco: Never   Vaping Use   • Vaping status: Never Used   Substance and Sexual Activity   • Alcohol use: No   • Drug use: No   • Sexual activity: Not Currently       Medications:     Current Outpatient Medications:   •  aspirin 81 MG EC tablet, Take 1 tablet by mouth daily., Disp: , Rfl:   •  atorvastatin (LIPITOR) 40 MG tablet, Take 1 tablet by mouth every night., Disp: 30 tablet, Rfl: 2  •  Docusate Calcium (STOOL SOFTENER PO), Take  by mouth 2 (two) times a day., Disp: , Rfl:   •  donepezil (ARICEPT) 10 MG tablet, Take 1 tablet by mouth Daily., Disp: 90 tablet, Rfl: 3  •  famotidine (PEPCID) 20 MG tablet, Take 1 tablet by mouth 2 (Two) Times a Day., Disp: , Rfl:   •  levETIRAcetam (KEPPRA) 750 MG tablet, Take 1 tablet by mouth Every 12 (Twelve) Hours., Disp: , Rfl:   •  metoprolol tartrate (LOPRESSOR) 25 MG tablet, Take 1 tablet by mouth Every 12 (Twelve) Hours., Disp: , Rfl:   •  Multiple Vitamin (MULTI VITAMIN DAILY PO), Take  by mouth., Disp: , Rfl:   •  Probiotic Product (PROBIOTIC-10 PO), Take  by mouth., Disp: , Rfl:   •  rivaroxaban (XARELTO) 20 MG tablet, Take 1 tablet by mouth Daily., Disp: , Rfl:     Allergies:   Allergies   Allergen Reactions   • Codeine Irritability       PHQ-9 Total Score:     STEADI Fall Risk Assessment was completed, and patient is at HIGH risk for falls. Assessment completed on:4/30/2024    Objective     Physical Exam:   Physical Exam  Eyes:      Pupils: Pupils are equal, round, and reactive to light.   Neurological:      Mental Status: He is oriented to person, place, and time.      Coordination: Finger-Nose-Finger Test abnormal and Heel to Shin Test abnormal. Romberg Test normal.      Gait: Gait is intact. Tandem walk abnormal.      Deep Tendon Reflexes:      Reflex Scores:       Tricep reflexes are 2+ on the right side and 2+ on the left side.       Bicep reflexes are  2+ on the right side and 2+ on the left side.       Brachioradialis reflexes are 2+ on the right side and 2+ on the left side.       Patellar reflexes are 2+ on the right side and 2+ on the left side.       Achilles reflexes are 2+ on the right side and 2+ on the left side.        Neurologic Exam     Mental Status   Oriented to person, place, and time.   Follows 3 step commands.   Attention: decreased. Concentration: decreased.   Level of consciousness: alert  Knowledge: consistent with education.   Normal comprehension.   Masked facies. Speech is not fluid     Cranial Nerves     CN III, IV, VI   Pupils are equal, round, and reactive to light.  Right pupil: Accommodation: intact.   Left pupil: Accommodation: intact.   CN III: no CN III palsy  CN VI: no CN VI palsy  Nystagmus: none   Diplopia: none  Upgaze: normal  Downgaze: normal  Conjugate gaze: present    CN VIII   Hearing: intact    CN XII   CN XII normal.   Masked face. Keeps his eyes closed for most of interview     Motor Exam   Muscle bulk: normal  Overall muscle tone: normal    Strength   Right biceps: 4/5  Left biceps: 4/5  Right triceps: 4/5  Left triceps: 4/5  Right interossei: 4/5  Left interossei: 4/5  Right quadriceps: 4/5  Left quadriceps: 4/5  Right anterior tibial: 4/5  Left anterior tibial: 4/5  Right posterior tibial: 4/5  Left posterior tibial: 4/5Obvious bradykinesia     Sensory Exam   Right arm light touch: normal  Left arm light touch: normal  Right leg light touch: decreased from toes  Left leg light touch: decreased from toes    Gait, Coordination, and Reflexes     Gait  Gait: normal    Coordination   Romberg: negative  Finger to nose coordination: abnormal  Heel to shin coordination: abnormal  Tandem walking coordination: abnormal    Tremor   Resting tremor: absent  Action tremor: left arm and right arm    Reflexes   Right brachioradialis: 2+  Left brachioradialis: 2+  Right biceps: 2+  Left biceps: 2+  Right triceps: 2+  Left triceps:  "2+  Right patellar: 2+  Left patellar: 2+  Right achilles: 2+  Left achilles: 2+  Right : 2+  Left : 2+       Vital Signs:   Vitals:    04/30/24 1458   BP: 126/66   BP Location: Right arm   Pulse: 72   Resp: 12   SpO2: 98%   Weight: 85.4 kg (188 lb 3.2 oz)   Height: 182.9 cm (72\")  Comment: Noted from Patient     Body mass index is 25.52 kg/m².         Assessment / Plan      Assessment/Plan:   Diagnoses and all orders for this visit:    1. Balance disorder (Primary)  -     Cancel: EMG & Nerve Conduction Test; Future  -     Paraneoplastic Autoantibody Evalulation; Future  -     Anti-Myelin Oligodendrocyte Glycoprotein (MOG), Serum; Future  -     Neuromyelitis Optica (NMO) Auto Antibody, IgG; Future  -     Vitamin B12 & Folate; Future  -     CK; Future  -     C-reactive Protein; Future  -     JACQUELINE + PE; Future  -     Ammonia; Future  -     Levetiracetam Level (Keppra); Future  -     EMG & Nerve Conduction Test; Future    2. At high risk for falls  -     Cancel: EMG & Nerve Conduction Test; Future  -     Paraneoplastic Autoantibody Evalulation; Future  -     Anti-Myelin Oligodendrocyte Glycoprotein (MOG), Serum; Future  -     Neuromyelitis Optica (NMO) Auto Antibody, IgG; Future  -     Vitamin B12 & Folate; Future  -     CK; Future  -     C-reactive Protein; Future  -     JACQUELINE + PE; Future  -     Ammonia; Future  -     Levetiracetam Level (Keppra); Future  -     EMG & Nerve Conduction Test; Future    3. Syncope and collapse  -     Paraneoplastic Autoantibody Evalulation; Future  -     Anti-Myelin Oligodendrocyte Glycoprotein (MOG), Serum; Future  -     Neuromyelitis Optica (NMO) Auto Antibody, IgG; Future  -     Vitamin B12 & Folate; Future  -     CK; Future  -     C-reactive Protein; Future  -     JACQUELINE + PE; Future  -     Ammonia; Future  -     Levetiracetam Level (Keppra); Future    4. Left leg weakness  -     Paraneoplastic Autoantibody Evalulation; Future  -     Anti-Myelin Oligodendrocyte Glycoprotein (MOG), " Serum; Future  -     Neuromyelitis Optica (NMO) Auto Antibody, IgG; Future  -     Vitamin B12 & Folate; Future  -     CK; Future  -     C-reactive Protein; Future  -     JACQUELINE + PE; Future  -     Ammonia; Future  -     Levetiracetam Level (Keppra); Future    5. Bradykinesia  -     Acetylcholine Receptor Antibody Panel; Future             Patient Education:       Reviewed medications, potential side effects and signs and symptoms to report. Discussed risk versus benefits of treatment plan with patient and/or family-including medications, labs and radiology that may be ordered. Addressed questions and concerns during visit. Patient and/or family verbalized understanding and agree with plan. Instructed to call the office with any questions and report to ER with any life-threatening symptoms.     Follow Up:   Return in about 3 months (around 7/30/2024) for Recheck.    During this visit the following were done:  Labs Reviewed []    Labs Ordered []    Radiology Reports Reviewed []    Radiology Ordered []    PCP Records Reviewed []    Referring Provider Records Reviewed []    ER Records Reviewed []    Hospital Records Reviewed []    History Obtained From Family []    Radiology Images Reviewed []    Other Reviewed []    Records Requested []      Soraya Ryder, DNP, APRN

## 2024-05-01 ENCOUNTER — LAB (OUTPATIENT)
Dept: LAB | Facility: HOSPITAL | Age: 74
End: 2024-05-01
Payer: MEDICARE

## 2024-05-01 ENCOUNTER — PROCEDURE VISIT (OUTPATIENT)
Dept: NEUROLOGY | Facility: CLINIC | Age: 74
End: 2024-05-01
Payer: MEDICARE

## 2024-05-01 DIAGNOSIS — G60.9 IDIOPATHIC PERIPHERAL NEUROPATHY: Primary | ICD-10-CM

## 2024-05-01 DIAGNOSIS — Z91.81 AT HIGH RISK FOR FALLS: ICD-10-CM

## 2024-05-01 DIAGNOSIS — R25.8 BRADYKINESIA: ICD-10-CM

## 2024-05-01 DIAGNOSIS — R55 SYNCOPE AND COLLAPSE: ICD-10-CM

## 2024-05-01 DIAGNOSIS — R29.898 LEFT LEG WEAKNESS: ICD-10-CM

## 2024-05-01 DIAGNOSIS — R26.89 BALANCE DISORDER: ICD-10-CM

## 2024-05-01 LAB — AMMONIA BLD-SCNC: 19 UMOL/L (ref 16–60)

## 2024-05-01 PROCEDURE — 86362 MOG-IGG1 ANTB CBA EACH: CPT

## 2024-05-01 PROCEDURE — 86140 C-REACTIVE PROTEIN: CPT

## 2024-05-01 PROCEDURE — 86041 ACETYLCHOLN RCPTR BNDNG ANTB: CPT

## 2024-05-01 PROCEDURE — 80177 DRUG SCRN QUAN LEVETIRACETAM: CPT

## 2024-05-01 PROCEDURE — 86043 ACETYLCHOLN RCPTR MODLG ANTB: CPT

## 2024-05-01 PROCEDURE — 86042 ACETYLCHOLN RCPTR BLCKG ANTB: CPT

## 2024-05-01 PROCEDURE — 84155 ASSAY OF PROTEIN SERUM: CPT

## 2024-05-01 PROCEDURE — 36415 COLL VENOUS BLD VENIPUNCTURE: CPT

## 2024-05-01 PROCEDURE — 82746 ASSAY OF FOLIC ACID SERUM: CPT

## 2024-05-01 PROCEDURE — 84165 PROTEIN E-PHORESIS SERUM: CPT

## 2024-05-01 PROCEDURE — 86334 IMMUNOFIX E-PHORESIS SERUM: CPT

## 2024-05-01 PROCEDURE — 86053 AQAPRN-4 ANTB FLO CYTMTRY EA: CPT

## 2024-05-01 PROCEDURE — 82607 VITAMIN B-12: CPT

## 2024-05-01 PROCEDURE — 82550 ASSAY OF CK (CPK): CPT

## 2024-05-01 PROCEDURE — 82140 ASSAY OF AMMONIA: CPT

## 2024-05-01 PROCEDURE — 82784 ASSAY IGA/IGD/IGG/IGM EACH: CPT

## 2024-05-01 NOTE — PROGRESS NOTES
[facility] [place]   Electrodiagnostic Laboratory    Nerve Conduction & EMG Report        Patient: Christo Prince   Patient ID: [id number]   YOB: 1950  Sex: male      Exam Physician:  Sheldon Hickman MD  Refer Physician:  Soraya MOREAU    Electromyogram and Nerve Conduction Velocity Procedure Note    Hx: 74 y.o. right handed male with complaint of unsteady gait.. Symptoms have been present for several months and were provoked by walking. Significant past medical history includes dementia.  Family history no family history of nerve or muscle disease.    Exam: Motor power is normal. There is no atrophy. There are no fasciculations. Deep tendon reflexes are present and symmetrical. Sensory exam is normal.      Edx studies of the B LE were performed to evaluate for peripheral neuropathy.     NCS Examination   For sensory nerve conduction studies, the amplitude is measured peak-to-peak, the latency reported is the distal peak latency, and the conduction velocity, if measured, is determined from onset latencies and is over the forearm.   For motor nerve conduction studies, the amplitude is measured baseline-to-peak, the latency reported is the distal onset latency, the conduction velocity is calculated over the forearm, and the F wave latency is the minimum latency.   Unless otherwise noted, the hand temperature was monitored continuously and remained between 32°C and 36°C during the performance of the NCSs.     Nerve Conduction Studies  Anti Sensory Summary Table     Stim Site NR Norm Peak (ms) O-P Amp (µV) Norm O-P Amp Onset (ms) Site1 Site2 Delta-0 (ms) Dist (cm) Mao (m/s) Norm Mao (m/s)   Left Sup Fibular Anti Sensory (Ant Lat Mall)   14 cm *NR <4.4  >5.0  14 cm Ant Lat Mall  14.0     Right Sup Fibular Anti Sensory (Ant Lat Mall)   14 cm *NR <4.4  >5.0  14 cm Ant Lat Mall  14.0     Left Sural Anti Sensory (Lat Mall)   Calf *NR <4.0  >5.0  Calf Lat Mall  14.0     Right Sural Anti  Sensory (Lat Mall)   Calf *NR <4.0  >5.0  Calf Lat Mall  14.0       Motor Summary Table     Stim Site NR Onset (ms) Norm Onset (ms) O-P Amp (mV) Norm O-P Amp Site1 Site2 Delta-0 (ms) Dist (cm) Mao (m/s) Norm Mao (m/s)   Left Fibular Motor (Ext Dig Brev)   Ankle    4.4 <6.1 3.0 >2.5 B Fib Ankle 8.9 37.0 42 >40   B Fib    13.3  3.0  Poplt B Fib 2.1 9.0 43 >40   Poplt    15.4  2.3          Right Fibular Motor (Ext Dig Brev)   Ankle    5.2 <6.1 *2.0 >2.5 B Fib Ankle 8.2 35.0 43 >40   B Fib    13.4  1.7  Poplt B Fib 2.5 9.0 *36 >40   Poplt    15.9  1.8          Left Tibial Motor (Abd Higginbotham Brev)   Ankle    5.2 <6.1 *2.6 >3.0 Knee Ankle 13.2 42.0 *32 >40   Knee    18.4  1.0          Right Tibial Motor (Abd Higginbotham Brev)   Ankle    5.4 <6.1 *2.2 >3.0 Knee Ankle 12.2 42.0 *34 >40   Knee    17.6  0.5            F Wave Studies     NR F-Lat (ms) Lat Norm (ms) L-R F-Lat (ms) L-R Lat Norm   Left Fibular (Mrkrs) (EDB)      *62.03 <60 0.63 <5.1   Right Fibular (Mrkrs) (EDB)      *62.66 <60 0.63 <5.1   Left Tibial (Mrkrs) (Abd Hallucis)      *63.91 <61 1.72 <5.7   Right Tibial (Mrkrs) (Abd Hallucis)      *65.63 <61 1.72 <5.7     H Reflex Studies     NR H-Lat (ms) L-R H-Lat (ms) L-R Lat Norm   Left Tibial (Mrkrs) (Gastroc)      35.63 0.34 <2.0   Right Tibial (Mrkrs) (Gastroc)      35.97 0.34 <2.0         EMG Examination   The study was performed with a concentric needle electrode. Fibrillation and fasciculation activity is graded from none (0) to continuous (4+). The configuration and recruitment pattern of motor unit action potentials under voluntary control, if not normal, are described below          Side Muscle Nerve Root Ins Act Fibs Psw Amp Dur Poly Recrt Int Pat Comment   Right AntTibialis Dp Br Fibular L4-5 Nml Nml Nml Nml Nml 0 Nml Nml    Right Gastroc Tibial S1-2 Nml Nml Nml Nml Nml 0 Nml Nml    Right BicepsFemL Sciatic L5-S2 Nml Nml Nml Nml Nml 0 Nml Nml    Right VastusMed Femoral L2-4 Nml Nml Nml Nml Nml 0 Nml Nml    Right  Iliopsoas Femoral L2-3 Nml Nml Nml Nml Nml 0 Nml Nml    Left AntTibialis Dp Br Fibular L4-5 Nml Nml Nml Nml Nml 0 Nml Nml    Left Gastroc Tibial S1-2 Nml Nml Nml Nml Nml 0 Nml Nml    Left BicepsFemL Sciatic L5-S2 Nml Nml Nml Nml Nml 0 Nml Nml    Left VastusMed Femoral L2-4 Nml Nml Nml Nml Nml 0 Nml Nml    Left Iliopsoas Femoral L2-3 Nml Nml Nml Nml Nml 0 Nml Nml       NCV FINDINGS:  Evaluation of the right Fibular motor nerve showed reduced amplitude (2.0 mV) and decreased conduction velocity (Poplt-B Fib, 36 m/s).  The left tibial motor and the right tibial motor nerves showed reduced amplitude (L2.6, R2.2 mV) and decreased conduction velocity (Knee-Ankle, L32, R34 m/s).  The left Sup Fibular sensory and the right Sup Fibular sensory nerves showed no response (14 cm).  The left sural sensory and the right sural sensory nerves showed no response (Calf).  All remaining nerves (as indicated in the following tables) were within normal limits.  All left vs. right side differences were within normal limits.  F Wave studies indicate that the left Fibular F wave has prolonged latency (62.03 ms).  The right Fibular F wave has prolonged latency (62.66 ms).  The left tibial F wave has prolonged latency (63.91 ms).  The right tibial F wave has prolonged latency (65.63 ms).  All F Wave left vs. right side latency differences were within normal limits.  All H Reflex left vs. right side latency differences were within normal limits.      EMG FINDINGS:    All examined muscles (as indicated in the following table) showed no evidence of electrical instability.            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            Instrument used:  Teca Synergy        Performed by:          Sheldon Hickman MD

## 2024-05-01 NOTE — PROGRESS NOTES
"Chief Complaint  No chief complaint on file.    Subjective    {Problem List  Visit Diagnosis   Encounters  Notes  Medications  Labs  Result Review Imaging  Media :23}    Christo Prince presents to [facility] NEUROLOGY  History of Present Illness    74 y.o. male returns in follow up.  Last visit on 6/29/22 with Tawana Rodriguez for MCI continued on Aricept.      Admitted to [facility]  1/22/24 for seizure like activity. Found to be in Rapid A-fib     Taking Keppra 500mg 1/ 1/2 tab bid.   EEG-neg  HCT 1/22/24 B calcifications cerebellum/BG/cerebral hemispheres.    Objective   Vital Signs:  There were no vitals taken for this visit.  Estimated body mass index is 25.52 kg/m² as calculated from the following:    Height as of 4/30/24: 182.9 cm (72\").    Weight as of 4/30/24: 85.4 kg (188 lb 3.2 oz).       {BMI is >= 25 and <30. (Overweight) The following options were offered after discussion; (Optional):55832}    Neurologic Exam     Mental Status   Oriented to person, place, and time.   Speech: speech is normal   Level of consciousness: alert    Cranial Nerves   Cranial nerves II through XII intact.     Motor Exam   Muscle bulk: normal  Right arm tone: cogwheel rigidity  Left arm tone: normal  Right leg tone: normal  Left leg tone: normal    Strength   Strength 5/5 throughout.     Gait, Coordination, and Reflexes     Gait  Gait: shuffling and wide-based (some forward leaning)    Coordination   Finger to nose coordination: normal    Tremor   Resting tremor: absent  Intention tremor: absent  Action tremor: absent  Decreased finger and heels taps right, slightly improved today        Physical Exam  Neurological:      Mental Status: He is oriented to person, place, and time.      Cranial Nerves: Cranial nerves 2-12 are intact.      Motor: Motor strength is normal.     Coordination: Finger-Nose-Finger Test normal.   Psychiatric:         Speech: Speech normal.        Result Review :{Labs  " Result Review  Imaging  Med Tab  Media  Procedures :23}    The following data was reviewed by: Sheldon Hickman MD on 05/01/2024:    Data reviewed : Radiologic studies HCT, EEG           Assessment and Plan {CC Problem List  Visit Diagnosis   ROS  Review (Popup)  Health Maintenance  Quality  BestPractice  Medications  SmartSets  SnapShot Encounters  Media :23}    There are no diagnoses linked to this encounter.       {Time Spent (Optional):48891}  Follow Up {Instructions Charge Capture  Follow-up Communications :23}    No follow-ups on file.  Patient was given instructions and counseling regarding his condition or for health maintenance advice. Please see specific information pulled into the AVS if appropriate.

## 2024-05-01 NOTE — LETTER
May 1, 2024       No Recipients    Patient: Christo Prince   YOB: 1950   Date of Visit: 5/1/2024     Dear Soraya Ryder, NISHA, APRN:       Thank you for referring Christo Prince to me for evaluation. Below are the relevant portions of my assessment and plan of care.    If you have questions, please do not hesitate to call me. I look forward to following Christo along with you.         Sincerely,        Sheldon Hickman MD        CC:   No Recipients    Sheldon Hickman MD  05/01/24 1505  Sign when Signing Visit      [facility]   Electrodiagnostic Laboratory    Nerve Conduction & EMG Report        Patient: Christo Prince   Patient ID: [id number]   YOB: 1950  Sex: male      Exam Physician:  Sheldon Hickman MD  Refer Physician:  Soraya Ryder APRYANET    Electromyogram and Nerve Conduction Velocity Procedure Note    Hx: 74 y.o. right handed male with complaint of unsteady gait.. Symptoms have been present for several months and were provoked by walking. Significant past medical history includes dementia.  Family history no family history of nerve or muscle disease.    Exam: Motor power is normal. There is no atrophy. There are no fasciculations. Deep tendon reflexes are present and symmetrical. Sensory exam is normal.      Edx studies of the B LE were performed to evaluate for peripheral neuropathy.     NCS Examination   For sensory nerve conduction studies, the amplitude is measured peak-to-peak, the latency reported is the distal peak latency, and the conduction velocity, if measured, is determined from onset latencies and is over the forearm.   For motor nerve conduction studies, the amplitude is measured baseline-to-peak, the latency reported is the distal onset latency, the conduction velocity is calculated over the forearm, and the F wave latency is the minimum latency.   Unless otherwise noted, the hand temperature was monitored continuously and remained  between 32°C and 36°C during the performance of the NCSs.     Nerve Conduction Studies  Anti Sensory Summary Table     Stim Site NR Norm Peak (ms) O-P Amp (µV) Norm O-P Amp Onset (ms) Site1 Site2 Delta-0 (ms) Dist (cm) Mao (m/s) Norm Mao (m/s)   Left Sup Fibular Anti Sensory (Ant Lat Mall)   14 cm *NR <4.4  >5.0  14 cm Ant Lat Mall  14.0     Right Sup Fibular Anti Sensory (Ant Lat Mall)   14 cm *NR <4.4  >5.0  14 cm Ant Lat Mall  14.0     Left Sural Anti Sensory (Lat Mall)   Calf *NR <4.0  >5.0  Calf Lat Mall  14.0     Right Sural Anti Sensory (Lat Mall)   Calf *NR <4.0  >5.0  Calf Lat Mall  14.0       Motor Summary Table     Stim Site NR Onset (ms) Norm Onset (ms) O-P Amp (mV) Norm O-P Amp Site1 Site2 Delta-0 (ms) Dist (cm) Mao (m/s) Norm Mao (m/s)   Left Fibular Motor (Ext Dig Brev)   Ankle    4.4 <6.1 3.0 >2.5 B Fib Ankle 8.9 37.0 42 >40   B Fib    13.3  3.0  Poplt B Fib 2.1 9.0 43 >40   Poplt    15.4  2.3          Right Fibular Motor (Ext Dig Brev)   Ankle    5.2 <6.1 *2.0 >2.5 B Fib Ankle 8.2 35.0 43 >40   B Fib    13.4  1.7  Poplt B Fib 2.5 9.0 *36 >40   Poplt    15.9  1.8          Left Tibial Motor (Abd Higginbotham Brev)   Ankle    5.2 <6.1 *2.6 >3.0 Knee Ankle 13.2 42.0 *32 >40   Knee    18.4  1.0          Right Tibial Motor (Abd Higginbotham Brev)   Ankle    5.4 <6.1 *2.2 >3.0 Knee Ankle 12.2 42.0 *34 >40   Knee    17.6  0.5            F Wave Studies     NR F-Lat (ms) Lat Norm (ms) L-R F-Lat (ms) L-R Lat Norm   Left Fibular (Mrkrs) (EDB)      *62.03 <60 0.63 <5.1   Right Fibular (Mrkrs) (EDB)      *62.66 <60 0.63 <5.1   Left Tibial (Mrkrs) (Abd Hallucis)      *63.91 <61 1.72 <5.7   Right Tibial (Mrkrs) (Abd Hallucis)      *65.63 <61 1.72 <5.7     H Reflex Studies     NR H-Lat (ms) L-R H-Lat (ms) L-R Lat Norm   Left Tibial (Mrkrs) (Gastroc)      35.63 0.34 <2.0   Right Tibial (Mrkrs) (Gastroc)      35.97 0.34 <2.0         EMG Examination   The study was performed with a concentric needle electrode. Fibrillation and  fasciculation activity is graded from none (0) to continuous (4+). The configuration and recruitment pattern of motor unit action potentials under voluntary control, if not normal, are described below          Side Muscle Nerve Root Ins Act Fibs Psw Amp Dur Poly Recrt Int Pat Comment   Right AntTibialis Dp Br Fibular L4-5 Nml Nml Nml Nml Nml 0 Nml Nml    Right Gastroc Tibial S1-2 Nml Nml Nml Nml Nml 0 Nml Nml    Right BicepsFemL Sciatic L5-S2 Nml Nml Nml Nml Nml 0 Nml Nml    Right VastusMed Femoral L2-4 Nml Nml Nml Nml Nml 0 Nml Nml    Right Iliopsoas Femoral L2-3 Nml Nml Nml Nml Nml 0 Nml Nml    Left AntTibialis Dp Br Fibular L4-5 Nml Nml Nml Nml Nml 0 Nml Nml    Left Gastroc Tibial S1-2 Nml Nml Nml Nml Nml 0 Nml Nml    Left BicepsFemL Sciatic L5-S2 Nml Nml Nml Nml Nml 0 Nml Nml    Left VastusMed Femoral L2-4 Nml Nml Nml Nml Nml 0 Nml Nml    Left Iliopsoas Femoral L2-3 Nml Nml Nml Nml Nml 0 Nml Nml       NCV FINDINGS:  Evaluation of the right Fibular motor nerve showed reduced amplitude (2.0 mV) and decreased conduction velocity (Poplt-B Fib, 36 m/s).  The left tibial motor and the right tibial motor nerves showed reduced amplitude (L2.6, R2.2 mV) and decreased conduction velocity (Knee-Ankle, L32, R34 m/s).  The left Sup Fibular sensory and the right Sup Fibular sensory nerves showed no response (14 cm).  The left sural sensory and the right sural sensory nerves showed no response (Calf).  All remaining nerves (as indicated in the following tables) were within normal limits.  All left vs. right side differences were within normal limits.  F Wave studies indicate that the left Fibular F wave has prolonged latency (62.03 ms).  The right Fibular F wave has prolonged latency (62.66 ms).  The left tibial F wave has prolonged latency (63.91 ms).  The right tibial F wave has prolonged latency (65.63 ms).  All F Wave left vs. right side latency differences were within normal limits.  All H Reflex left vs. right side  latency differences were within normal limits.      EMG FINDINGS:    All examined muscles (as indicated in the following table) showed no evidence of electrical instability.            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            Instrument used:  Teca Synergy        Performed by:          Sheldon Hickman MD

## 2024-05-02 ENCOUNTER — TELEPHONE (OUTPATIENT)
Dept: NEUROLOGY | Facility: CLINIC | Age: 74
End: 2024-05-02
Payer: MEDICARE

## 2024-05-02 LAB
CK SERPL-CCNC: 93 U/L (ref 20–200)
CRP SERPL-MCNC: <0.3 MG/DL (ref 0–0.5)
FOLATE SERPL-MCNC: >20 NG/ML (ref 4.78–24.2)
VIT B12 BLD-MCNC: 769 PG/ML (ref 211–946)

## 2024-05-02 NOTE — PROGRESS NOTES
Please notify pt wife his EMG showed a very mild neuropathy of his lower extremities. He may benefit from using a cane or walker to improve his balance.

## 2024-05-02 NOTE — TELEPHONE ENCOUNTER
Called patient and gave wife results.  Kesha was understanding and appreciative.      Please notify pt wife his EMG showed a very mild neuropathy of his lower extremities. He may benefit from using a cane or walker to improve his balance.        ----- Message from Valerie RODRIGES sent at 5/2/2024 11:58 AM EDT -----        ----- Message -----  From: Sheldon Hickman MD  Sent: 5/1/2024   3:05 PM EDT  To: Soraya Ryder DNP, APRN

## 2024-05-03 ENCOUNTER — DOCUMENTATION (OUTPATIENT)
Dept: NEUROLOGY | Facility: CLINIC | Age: 74
End: 2024-05-03
Payer: MEDICARE

## 2024-05-03 LAB
ALBUMIN SERPL ELPH-MCNC: 3.9 G/DL (ref 2.9–4.4)
ALBUMIN/GLOB SERPL: 1.2 {RATIO} (ref 0.7–1.7)
ALPHA1 GLOB SERPL ELPH-MCNC: 0.2 G/DL (ref 0–0.4)
ALPHA2 GLOB SERPL ELPH-MCNC: 0.7 G/DL (ref 0.4–1)
B-GLOBULIN SERPL ELPH-MCNC: 0.9 G/DL (ref 0.7–1.3)
GAMMA GLOB SERPL ELPH-MCNC: 1.4 G/DL (ref 0.4–1.8)
GLOBULIN SER-MCNC: 3.3 G/DL (ref 2.2–3.9)
IGA SERPL-MCNC: 222 MG/DL (ref 61–437)
IGG SERPL-MCNC: 1422 MG/DL (ref 603–1613)
IGM SERPL-MCNC: 53 MG/DL (ref 15–143)
INTERPRETATION SERPL IEP-IMP: NORMAL
LABORATORY COMMENT REPORT: NORMAL
M PROTEIN SERPL ELPH-MCNC: NORMAL G/DL
PROT SERPL-MCNC: 7.2 G/DL (ref 6–8.5)

## 2024-05-04 LAB — LEVETIRACETAM SERPL-MCNC: 22 UG/ML (ref 10–40)

## 2024-05-06 LAB — MOG AB SER QL CBA IFA: NEGATIVE

## 2024-05-07 LAB
AMPAR1 AB SER QL IF: NEGATIVE
AMPAR2 AB SER QL IF: NEGATIVE
AMPHIPHYSIN AB SER QL: NEGATIVE
AQP4 H2O CHANNEL AB SERPL IA-ACNC: NEGATIVE
CASPR2 IGG SERPL QL IF: NEGATIVE
CV2 IGG SER-ACNC: NEGATIVE
DPPX IGG SERPL QL IF: NEGATIVE
GABABR AB SER QL IF: NEGATIVE
GAD65 IGG+IGM SER IA-SCNC: NEGATIVE NMOL/L
GLIAL NUC TYPE 1 AB SER QL IF: NEGATIVE
HU AB SER QL IF: NEGATIVE
HU2 AB SER QL IF: NEGATIVE
HU3 AB SER QL: NEGATIVE
IGLON5 IGG SER QL IF: NEGATIVE
IMP & REVIEW OF LAB RESULTS: NEGATIVE
IP3R 1 IGG SER QL IF: NEGATIVE
LGI1 IGG SERPL QL IF: NEGATIVE
MA+TA AB SER QL: NEGATIVE
MGLUR1 IGG SER QL IF: NEGATIVE
NMDAR1 AB SER QL IF: NEGATIVE
PCA-1 AB SER QL IF: NEGATIVE
PCA-2 AB SER QL IF: NEGATIVE
PCA-TR AB SER QL IF: NEGATIVE
PCA-TR AB SER QL IF: NEGATIVE
VGCC AB SER-SCNC: <1 PMOL/L (ref 0–30)
ZIC4 AB SER QL: NEGATIVE

## 2024-05-08 LAB — AQP4 H2O CHANNEL IGG SERPL QL: NEGATIVE

## 2024-05-09 ENCOUNTER — TELEPHONE (OUTPATIENT)
Dept: NEUROLOGY | Facility: CLINIC | Age: 74
End: 2024-05-09
Payer: MEDICARE

## 2024-05-11 LAB
ACHR BIND AB SER-SCNC: 0.06 NMOL/L (ref 0–0.24)
ACHR BLOCK AB SER-ACNC: 19 % (ref 0–25)
ACHR MOD AB SER QL FC: 7 % (ref 0–45)

## 2024-05-13 ENCOUNTER — TELEPHONE (OUTPATIENT)
Dept: NEUROLOGY | Facility: CLINIC | Age: 74
End: 2024-05-13
Payer: MEDICARE

## 2024-05-13 NOTE — TELEPHONE ENCOUNTER
Called and spoke with wife Kesha and gave results.  She was understanding and appreciative.    Please let Christo know that the labs looking for myasthenia gravis were negative.